# Patient Record
Sex: MALE | Race: BLACK OR AFRICAN AMERICAN | NOT HISPANIC OR LATINO | Employment: FULL TIME | RURAL
[De-identification: names, ages, dates, MRNs, and addresses within clinical notes are randomized per-mention and may not be internally consistent; named-entity substitution may affect disease eponyms.]

---

## 2022-02-16 ENCOUNTER — OFFICE VISIT (OUTPATIENT)
Dept: FAMILY MEDICINE | Facility: CLINIC | Age: 67
End: 2022-02-16
Payer: COMMERCIAL

## 2022-02-16 VITALS
BODY MASS INDEX: 31.4 KG/M2 | DIASTOLIC BLOOD PRESSURE: 93 MMHG | HEIGHT: 69 IN | RESPIRATION RATE: 18 BRPM | WEIGHT: 212 LBS | SYSTOLIC BLOOD PRESSURE: 179 MMHG | HEART RATE: 73 BPM | TEMPERATURE: 98 F

## 2022-02-16 DIAGNOSIS — Z11.59 NEED FOR HEPATITIS C SCREENING TEST: ICD-10-CM

## 2022-02-16 DIAGNOSIS — R53.83 FATIGUE, UNSPECIFIED TYPE: Primary | ICD-10-CM

## 2022-02-16 DIAGNOSIS — Z12.5 SCREENING FOR PROSTATE CANCER: ICD-10-CM

## 2022-02-16 DIAGNOSIS — E53.8 VITAMIN B12 DEFICIENCY: ICD-10-CM

## 2022-02-16 DIAGNOSIS — Z11.4 SCREENING FOR HIV (HUMAN IMMUNODEFICIENCY VIRUS): ICD-10-CM

## 2022-02-16 DIAGNOSIS — E55.9 VITAMIN D DEFICIENCY: ICD-10-CM

## 2022-02-16 DIAGNOSIS — R73.01 IMPAIRED FASTING GLUCOSE: ICD-10-CM

## 2022-02-16 DIAGNOSIS — E78.5 HYPERLIPIDEMIA, UNSPECIFIED HYPERLIPIDEMIA TYPE: ICD-10-CM

## 2022-02-16 DIAGNOSIS — I10 HYPERTENSION, UNSPECIFIED TYPE: ICD-10-CM

## 2022-02-16 LAB
25(OH)D3 SERPL-MCNC: 22 NG/ML
ALBUMIN SERPL BCP-MCNC: 3.9 G/DL (ref 3.5–5)
ALBUMIN/GLOB SERPL: 1.1 {RATIO}
ALP SERPL-CCNC: 60 U/L (ref 45–115)
ALT SERPL W P-5'-P-CCNC: 57 U/L (ref 16–61)
ANION GAP SERPL CALCULATED.3IONS-SCNC: 8 MMOL/L (ref 7–16)
AST SERPL W P-5'-P-CCNC: 41 U/L (ref 15–37)
BASOPHILS # BLD AUTO: 0.06 K/UL (ref 0–0.2)
BASOPHILS NFR BLD AUTO: 1 % (ref 0–1)
BILIRUB SERPL-MCNC: 0.3 MG/DL (ref 0–1.2)
BUN SERPL-MCNC: 20 MG/DL (ref 7–18)
BUN/CREAT SERPL: 20 (ref 6–20)
CALCIUM SERPL-MCNC: 9.4 MG/DL (ref 8.5–10.1)
CHLORIDE SERPL-SCNC: 107 MMOL/L (ref 98–107)
CHOLEST SERPL-MCNC: 232 MG/DL (ref 0–200)
CHOLEST/HDLC SERPL: 3.9 {RATIO}
CO2 SERPL-SCNC: 27 MMOL/L (ref 21–32)
CREAT SERPL-MCNC: 0.98 MG/DL (ref 0.7–1.3)
CREAT UR-MCNC: 122 MG/DL (ref 39–259)
DIFFERENTIAL METHOD BLD: ABNORMAL
EOSINOPHIL # BLD AUTO: 0.23 K/UL (ref 0–0.5)
EOSINOPHIL NFR BLD AUTO: 3.9 % (ref 1–4)
ERYTHROCYTE [DISTWIDTH] IN BLOOD BY AUTOMATED COUNT: 14.6 % (ref 11.5–14.5)
EST. AVERAGE GLUCOSE BLD GHB EST-MCNC: 117 MG/DL
FOLATE SERPL-MCNC: >20 NG/ML (ref 3.1–17.5)
GLOBULIN SER-MCNC: 3.5 G/DL (ref 2–4)
GLUCOSE SERPL-MCNC: 117 MG/DL (ref 74–106)
HBA1C MFR BLD HPLC: 6.1 % (ref 4.5–6.6)
HCT VFR BLD AUTO: 41 % (ref 40–54)
HCV AB SER QL: NORMAL
HDLC SERPL-MCNC: 59 MG/DL (ref 40–60)
HGB BLD-MCNC: 13.5 G/DL (ref 13.5–18)
HIV 1+O+2 AB SERPL QL: NORMAL
IMM GRANULOCYTES # BLD AUTO: 0.01 K/UL (ref 0–0.04)
IMM GRANULOCYTES NFR BLD: 0.2 % (ref 0–0.4)
LDLC SERPL CALC-MCNC: 123 MG/DL
LDLC/HDLC SERPL: 2.1 {RATIO}
LYMPHOCYTES # BLD AUTO: 2.27 K/UL (ref 1–4.8)
LYMPHOCYTES NFR BLD AUTO: 38.6 % (ref 27–41)
MCH RBC QN AUTO: 28.6 PG (ref 27–31)
MCHC RBC AUTO-ENTMCNC: 32.9 G/DL (ref 32–36)
MCV RBC AUTO: 86.9 FL (ref 80–96)
MICROALBUMIN UR-MCNC: 5.6 MG/DL (ref 0–2.8)
MICROALBUMIN/CREAT RATIO PNL UR: 45.9 MG/G (ref 0–30)
MONOCYTES # BLD AUTO: 0.52 K/UL (ref 0–0.8)
MONOCYTES NFR BLD AUTO: 8.8 % (ref 2–6)
MPC BLD CALC-MCNC: 10.4 FL (ref 9.4–12.4)
NEUTROPHILS # BLD AUTO: 2.79 K/UL (ref 1.8–7.7)
NEUTROPHILS NFR BLD AUTO: 47.5 % (ref 53–65)
NONHDLC SERPL-MCNC: 173 MG/DL
NRBC # BLD AUTO: 0 X10E3/UL
NRBC, AUTO (.00): 0 %
PLATELET # BLD AUTO: 255 K/UL (ref 150–400)
POTASSIUM SERPL-SCNC: 4 MMOL/L (ref 3.5–5.1)
PROT SERPL-MCNC: 7.4 G/DL (ref 6.4–8.2)
PSA SERPL-MCNC: 0.28 NG/ML (ref 0–4.1)
RBC # BLD AUTO: 4.72 M/UL (ref 4.6–6.2)
SODIUM SERPL-SCNC: 138 MMOL/L (ref 136–145)
TRIGL SERPL-MCNC: 248 MG/DL (ref 35–150)
TSH SERPL DL<=0.005 MIU/L-ACNC: 1.1 UIU/ML (ref 0.36–3.74)
VIT B12 SERPL-MCNC: 932 PG/ML (ref 193–986)
VLDLC SERPL-MCNC: 50 MG/DL
WBC # BLD AUTO: 5.88 K/UL (ref 4.5–11)

## 2022-02-16 PROCEDURE — 99214 PR OFFICE/OUTPT VISIT, EST, LEVL IV, 30-39 MIN: ICD-10-PCS | Mod: ,,, | Performed by: NURSE PRACTITIONER

## 2022-02-16 PROCEDURE — G0103 PSA SCREENING: HCPCS | Mod: ,,, | Performed by: CLINICAL MEDICAL LABORATORY

## 2022-02-16 PROCEDURE — 83036 HEMOGLOBIN GLYCOSYLATED A1C: CPT | Mod: ,,, | Performed by: CLINICAL MEDICAL LABORATORY

## 2022-02-16 PROCEDURE — 82746 VITAMIN B12/FOLATE, SERUM PANEL: ICD-10-PCS | Mod: ,,, | Performed by: CLINICAL MEDICAL LABORATORY

## 2022-02-16 PROCEDURE — 1160F RVW MEDS BY RX/DR IN RCRD: CPT | Mod: CPTII,,, | Performed by: NURSE PRACTITIONER

## 2022-02-16 PROCEDURE — 82043 MICROALBUMIN / CREATININE RATIO URINE: ICD-10-PCS | Mod: ,,, | Performed by: CLINICAL MEDICAL LABORATORY

## 2022-02-16 PROCEDURE — 1160F PR REVIEW ALL MEDS BY PRESCRIBER/CLIN PHARMACIST DOCUMENTED: ICD-10-PCS | Mod: CPTII,,, | Performed by: NURSE PRACTITIONER

## 2022-02-16 PROCEDURE — 82306 VITAMIN D 25 HYDROXY: CPT | Mod: ,,, | Performed by: CLINICAL MEDICAL LABORATORY

## 2022-02-16 PROCEDURE — 82607 VITAMIN B-12: CPT | Mod: ,,, | Performed by: CLINICAL MEDICAL LABORATORY

## 2022-02-16 PROCEDURE — 82570 MICROALBUMIN / CREATININE RATIO URINE: ICD-10-PCS | Mod: ,,, | Performed by: CLINICAL MEDICAL LABORATORY

## 2022-02-16 PROCEDURE — 82746 ASSAY OF FOLIC ACID SERUM: CPT | Mod: ,,, | Performed by: CLINICAL MEDICAL LABORATORY

## 2022-02-16 PROCEDURE — 80053 COMPREHENSIVE METABOLIC PANEL: ICD-10-PCS | Mod: ,,, | Performed by: CLINICAL MEDICAL LABORATORY

## 2022-02-16 PROCEDURE — 87389 HIV 1 / 2 ANTIBODY: ICD-10-PCS | Mod: ,,, | Performed by: CLINICAL MEDICAL LABORATORY

## 2022-02-16 PROCEDURE — 99214 OFFICE O/P EST MOD 30 MIN: CPT | Mod: ,,, | Performed by: NURSE PRACTITIONER

## 2022-02-16 PROCEDURE — 1159F PR MEDICATION LIST DOCUMENTED IN MEDICAL RECORD: ICD-10-PCS | Mod: CPTII,,, | Performed by: NURSE PRACTITIONER

## 2022-02-16 PROCEDURE — 3077F PR MOST RECENT SYSTOLIC BLOOD PRESSURE >= 140 MM HG: ICD-10-PCS | Mod: CPTII,,, | Performed by: NURSE PRACTITIONER

## 2022-02-16 PROCEDURE — 86803 HEPATITIS C ANTIBODY: ICD-10-PCS | Mod: ,,, | Performed by: CLINICAL MEDICAL LABORATORY

## 2022-02-16 PROCEDURE — 82043 UR ALBUMIN QUANTITATIVE: CPT | Mod: ,,, | Performed by: CLINICAL MEDICAL LABORATORY

## 2022-02-16 PROCEDURE — 80053 COMPREHEN METABOLIC PANEL: CPT | Mod: ,,, | Performed by: CLINICAL MEDICAL LABORATORY

## 2022-02-16 PROCEDURE — 84443 ASSAY THYROID STIM HORMONE: CPT | Mod: ,,, | Performed by: CLINICAL MEDICAL LABORATORY

## 2022-02-16 PROCEDURE — 80061 LIPID PANEL: CPT | Mod: ,,, | Performed by: CLINICAL MEDICAL LABORATORY

## 2022-02-16 PROCEDURE — 3077F SYST BP >= 140 MM HG: CPT | Mod: CPTII,,, | Performed by: NURSE PRACTITIONER

## 2022-02-16 PROCEDURE — 82306 VITAMIN D: ICD-10-PCS | Mod: ,,, | Performed by: CLINICAL MEDICAL LABORATORY

## 2022-02-16 PROCEDURE — 87389 HIV-1 AG W/HIV-1&-2 AB AG IA: CPT | Mod: ,,, | Performed by: CLINICAL MEDICAL LABORATORY

## 2022-02-16 PROCEDURE — 85025 CBC WITH DIFFERENTIAL: ICD-10-PCS | Mod: ,,, | Performed by: CLINICAL MEDICAL LABORATORY

## 2022-02-16 PROCEDURE — 1159F MED LIST DOCD IN RCRD: CPT | Mod: CPTII,,, | Performed by: NURSE PRACTITIONER

## 2022-02-16 PROCEDURE — 82607 VITAMIN B12/FOLATE, SERUM PANEL: ICD-10-PCS | Mod: ,,, | Performed by: CLINICAL MEDICAL LABORATORY

## 2022-02-16 PROCEDURE — 85025 COMPLETE CBC W/AUTO DIFF WBC: CPT | Mod: ,,, | Performed by: CLINICAL MEDICAL LABORATORY

## 2022-02-16 PROCEDURE — 3008F BODY MASS INDEX DOCD: CPT | Mod: CPTII,,, | Performed by: NURSE PRACTITIONER

## 2022-02-16 PROCEDURE — 3080F DIAST BP >= 90 MM HG: CPT | Mod: CPTII,,, | Performed by: NURSE PRACTITIONER

## 2022-02-16 PROCEDURE — 83036 HEMOGLOBIN A1C: ICD-10-PCS | Mod: ,,, | Performed by: CLINICAL MEDICAL LABORATORY

## 2022-02-16 PROCEDURE — 82570 ASSAY OF URINE CREATININE: CPT | Mod: ,,, | Performed by: CLINICAL MEDICAL LABORATORY

## 2022-02-16 PROCEDURE — 3080F PR MOST RECENT DIASTOLIC BLOOD PRESSURE >= 90 MM HG: ICD-10-PCS | Mod: CPTII,,, | Performed by: NURSE PRACTITIONER

## 2022-02-16 PROCEDURE — 3008F PR BODY MASS INDEX (BMI) DOCUMENTED: ICD-10-PCS | Mod: CPTII,,, | Performed by: NURSE PRACTITIONER

## 2022-02-16 PROCEDURE — 84443 TSH: ICD-10-PCS | Mod: ,,, | Performed by: CLINICAL MEDICAL LABORATORY

## 2022-02-16 PROCEDURE — 86803 HEPATITIS C AB TEST: CPT | Mod: ,,, | Performed by: CLINICAL MEDICAL LABORATORY

## 2022-02-16 PROCEDURE — 80061 LIPID PANEL: ICD-10-PCS | Mod: ,,, | Performed by: CLINICAL MEDICAL LABORATORY

## 2022-02-16 PROCEDURE — G0103 PSA, SCREENING: ICD-10-PCS | Mod: ,,, | Performed by: CLINICAL MEDICAL LABORATORY

## 2022-02-16 RX ORDER — AMLODIPINE BESYLATE 10 MG/1
10 TABLET ORAL DAILY
Qty: 90 TABLET | Refills: 1 | Status: SHIPPED | OUTPATIENT
Start: 2022-02-16 | End: 2022-07-21 | Stop reason: SDUPTHER

## 2022-02-16 NOTE — PROGRESS NOTES
NAINA Cartwright   [address] [place] [address]     PATIENT NAME: Joey Hughes  : 1955  DATE: 22  MRN: [id number]      Billing Provider: NAINA Cartwright  Level of Service: [place] OFFICE/OUTPT VISIT, EST, LEVL IV, 30-39 MIN  Patient PCP Information     Provider PCP Type    NAINA Cartwright General          Reason for Visit / Chief Complaint: Follow-up (Check up)       Update PCP  Update Chief Complaint         History of Present Illness / Problem Focused Workflow     Joey Hughes presents to the clinic with Follow-up (Check up)     HPI    Review of Systems     Review of Systems   Constitutional: Negative for chills, diaphoresis, fatigue and fever.   HENT: Negative for nasal congestion, dental problem, ear pain and postnasal drip.    Eyes: Negative for visual disturbance.   Respiratory: Negative for shortness of breath and wheezing.    Cardiovascular: Negative for chest pain and palpitations.   Gastrointestinal: Negative for abdominal distention and abdominal pain.   Endocrine: Negative for cold intolerance and heat intolerance.   Genitourinary: Negative for enuresis.   Musculoskeletal: Negative for neck stiffness.   Integumentary:  Negative for pallor and rash.   Neurological: Negative for dizziness, seizures, speech difficulty and weakness.   Psychiatric/Behavioral: Negative for agitation.        Medical / Social / Family History     Past Medical History:   Diagnosis Date    High cholesterol     Hypertension        History reviewed. No pertinent surgical history.    Social History    reports that he has never smoked. He has never used smokeless tobacco. He reports current alcohol use. He reports that he does not use drugs.    Family History  's family history is not on file.    Medications and Allergies     Medications  No outpatient medications have been marked as taking for the 22 encounter (Office Visit) with NAINA Cartwright.       Allergies  Review of  "patient's allergies indicates:  No Known Allergies    Physical Examination   BP (!) 179/93 (BP Location: Left arm, Patient Position: Sitting, BP Method: Medium (Automatic))   Pulse 73   Temp 97.7 °F (36.5 °C) (Temporal)   Resp 18   Ht 5' 9" (1.753 m)   Wt 96.2 kg (212 lb)   BMI 31.31 kg/m²   Physical Exam  Vitals and nursing note reviewed.   Constitutional:       Appearance: Normal appearance.   HENT:      Head: Normocephalic and atraumatic.      Right Ear: External ear normal.      Left Ear: External ear normal.      Nose: Nose normal.      Mouth/Throat:      Mouth: Mucous membranes are moist.      Pharynx: Oropharynx is clear.   Eyes:      Pupils: Pupils are equal, round, and reactive to light.   Cardiovascular:      Rate and Rhythm: Normal rate and regular rhythm.      Pulses: Normal pulses.      Heart sounds: Normal heart sounds. No murmur heard.  No gallop.    Pulmonary:      Effort: Pulmonary effort is normal.      Breath sounds: Normal breath sounds. No wheezing or rales.   Abdominal:      General: Abdomen is flat. Bowel sounds are normal. There is no distension.      Palpations: Abdomen is soft.      Tenderness: There is no abdominal tenderness.   Musculoskeletal:         General: Normal range of motion.      Cervical back: Normal range of motion and neck supple.   Skin:     General: Skin is warm and dry.      Capillary Refill: Capillary refill takes less than 2 seconds.   Neurological:      General: No focal deficit present.      Mental Status: He is alert and oriented to person, place, and time.   Psychiatric:         Mood and Affect: Mood normal.         Behavior: Behavior normal.          Assessment and Plan (including Health Maintenance)      Problem List  Smart Sets  Document Outside HM   :    Plan:         Health Maintenance Due   Topic Date Due    Hepatitis C Screening  Never done    Lipid Panel  Never done    COVID-19 Vaccine (1) Never done    HIV Screening  Never done    TETANUS VACCINE  " Never done    Colorectal Cancer Screening  Never done    Shingles Vaccine (1 of 2) Never done    Influenza Vaccine (1) Never done       Problem List Items Addressed This Visit    None     Visit Diagnoses     Fatigue, unspecified type    -  Primary    Relevant Orders    CBC Auto Differential    TSH    Screening for prostate cancer        Relevant Orders    PSA, Screening    Vitamin B12 deficiency        Relevant Orders    Vitamin B12 & Folate    Vitamin D deficiency        Relevant Orders    Vitamin D    Hypertension, unspecified type        Relevant Medications    amLODIPine (NORVASC) 10 MG tablet    Other Relevant Orders    Comprehensive Metabolic Panel    Hyperlipidemia, unspecified hyperlipidemia type        Relevant Orders    Lipid Panel    Impaired fasting glucose        Relevant Orders    Microalbumin/Creatinine Ratio, Urine    Hemoglobin A1C    Need for hepatitis C screening test        Relevant Orders    Hepatitis C Antibody    Screening for HIV (human immunodeficiency virus)        Relevant Orders    HIV 1/2 Ag/Ab (4th Gen)          The patient has no Health Maintenance topics of status Not Due    No future appointments.     Follow up in about 3 months (around 5/16/2022), or if symptoms worsen or fail to improve.        Signature:  NAINA Cartwright      [address] [place] [address]    Date of encounter: 2/16/22

## 2022-02-17 RX ORDER — ROSUVASTATIN CALCIUM 5 MG/1
5 TABLET, COATED ORAL DAILY
Qty: 90 TABLET | Refills: 1 | Status: SHIPPED | OUTPATIENT
Start: 2022-02-17 | End: 2022-07-21 | Stop reason: SDUPTHER

## 2022-02-17 NOTE — PROGRESS NOTES
Choelsterol high, sent in crestor  A1c shows prediabetes, watch carbs, sugary drinks, concentrate on weight loss of 8-10 lbs and drink plenty of water. We ill recheck this in 3 months.

## 2022-02-18 ENCOUNTER — PATIENT OUTREACH (OUTPATIENT)
Dept: ADMINISTRATIVE | Facility: HOSPITAL | Age: 67
End: 2022-02-18

## 2022-03-02 ENCOUNTER — OFFICE VISIT (OUTPATIENT)
Dept: FAMILY MEDICINE | Facility: CLINIC | Age: 67
End: 2022-03-02
Payer: COMMERCIAL

## 2022-03-02 VITALS
TEMPERATURE: 98 F | HEART RATE: 64 BPM | SYSTOLIC BLOOD PRESSURE: 147 MMHG | HEIGHT: 69 IN | DIASTOLIC BLOOD PRESSURE: 78 MMHG | RESPIRATION RATE: 18 BRPM | WEIGHT: 212 LBS | BODY MASS INDEX: 31.4 KG/M2

## 2022-03-02 DIAGNOSIS — I10 HYPERTENSION, UNSPECIFIED TYPE: Primary | ICD-10-CM

## 2022-03-02 DIAGNOSIS — Z12.11 SCREENING FOR COLON CANCER: ICD-10-CM

## 2022-03-02 PROCEDURE — 3077F PR MOST RECENT SYSTOLIC BLOOD PRESSURE >= 140 MM HG: ICD-10-PCS | Mod: CPTII,,, | Performed by: NURSE PRACTITIONER

## 2022-03-02 PROCEDURE — 3044F HG A1C LEVEL LT 7.0%: CPT | Mod: CPTII,,, | Performed by: NURSE PRACTITIONER

## 2022-03-02 PROCEDURE — 99213 PR OFFICE/OUTPT VISIT, EST, LEVL III, 20-29 MIN: ICD-10-PCS | Mod: ,,, | Performed by: NURSE PRACTITIONER

## 2022-03-02 PROCEDURE — 3078F PR MOST RECENT DIASTOLIC BLOOD PRESSURE < 80 MM HG: ICD-10-PCS | Mod: CPTII,,, | Performed by: NURSE PRACTITIONER

## 2022-03-02 PROCEDURE — 1160F RVW MEDS BY RX/DR IN RCRD: CPT | Mod: CPTII,,, | Performed by: NURSE PRACTITIONER

## 2022-03-02 PROCEDURE — 99213 OFFICE O/P EST LOW 20 MIN: CPT | Mod: ,,, | Performed by: NURSE PRACTITIONER

## 2022-03-02 PROCEDURE — 3066F NEPHROPATHY DOC TX: CPT | Mod: CPTII,,, | Performed by: NURSE PRACTITIONER

## 2022-03-02 PROCEDURE — 1160F PR REVIEW ALL MEDS BY PRESCRIBER/CLIN PHARMACIST DOCUMENTED: ICD-10-PCS | Mod: CPTII,,, | Performed by: NURSE PRACTITIONER

## 2022-03-02 PROCEDURE — 3060F POS MICROALBUMINURIA REV: CPT | Mod: CPTII,,, | Performed by: NURSE PRACTITIONER

## 2022-03-02 PROCEDURE — 3060F PR POS MICROALBUMINURIA RESULT DOCUMENTED/REVIEW: ICD-10-PCS | Mod: CPTII,,, | Performed by: NURSE PRACTITIONER

## 2022-03-02 PROCEDURE — 3066F PR DOCUMENTATION OF TREATMENT FOR NEPHROPATHY: ICD-10-PCS | Mod: CPTII,,, | Performed by: NURSE PRACTITIONER

## 2022-03-02 PROCEDURE — 1159F PR MEDICATION LIST DOCUMENTED IN MEDICAL RECORD: ICD-10-PCS | Mod: CPTII,,, | Performed by: NURSE PRACTITIONER

## 2022-03-02 PROCEDURE — 3008F PR BODY MASS INDEX (BMI) DOCUMENTED: ICD-10-PCS | Mod: CPTII,,, | Performed by: NURSE PRACTITIONER

## 2022-03-02 PROCEDURE — 3077F SYST BP >= 140 MM HG: CPT | Mod: CPTII,,, | Performed by: NURSE PRACTITIONER

## 2022-03-02 PROCEDURE — 1159F MED LIST DOCD IN RCRD: CPT | Mod: CPTII,,, | Performed by: NURSE PRACTITIONER

## 2022-03-02 PROCEDURE — 3044F PR MOST RECENT HEMOGLOBIN A1C LEVEL <7.0%: ICD-10-PCS | Mod: CPTII,,, | Performed by: NURSE PRACTITIONER

## 2022-03-02 PROCEDURE — 3008F BODY MASS INDEX DOCD: CPT | Mod: CPTII,,, | Performed by: NURSE PRACTITIONER

## 2022-03-02 PROCEDURE — 3078F DIAST BP <80 MM HG: CPT | Mod: CPTII,,, | Performed by: NURSE PRACTITIONER

## 2022-03-02 NOTE — ASSESSMENT & PLAN NOTE
Blood Pressure Trends are:  BP Readings from Last 6 Encounters:   03/02/22 (!) 147/78   02/16/22 (!) 179/93       Most Recent Chemistry:  CMP  Lab Results   Component Value Date     02/16/2022    K 4.0 02/16/2022     02/16/2022    CO2 27 02/16/2022     (H) 02/16/2022    BUN 20 (H) 02/16/2022    CREATININE 0.98 02/16/2022    CALCIUM 9.4 02/16/2022    PROT 7.4 02/16/2022    ALBUMIN 3.9 02/16/2022    BILITOT 0.3 02/16/2022    ALKPHOS 60 02/16/2022    AST 41 (H) 02/16/2022    ALT 57 02/16/2022    ANIONGAP 8 02/16/2022    EGFRNONAA 82 02/16/2022      Patient presents to the clinic for hypertension follow up. Patient's blood pressure at this visit is 147/78 which is much improved since previous visit. Discussed lifestyle modifications including weight loss, exercise, and low sodium diet. Patient to obtain blood pressure machine and check blood pressures at home. He will bring BP log to next visit. Patient to follow up with me in clinic.

## 2022-03-02 NOTE — PROGRESS NOTES
NAINA Cartwright   [address] [place] [address]     PATIENT NAME: Joey Hughes  : 1955  DATE: 3/2/22  MRN: [id number]      Billing Provider: NAINA Cartwright  Level of Service: [place] OFFICE/OUTPT VISIT, EST, LEVL III, 20-29 MIN  Patient PCP Information     Provider PCP Type    NAINA Cartwright General          Reason for Visit / Chief Complaint: Follow-up (2 week follow up)       Update PCP  Update Chief Complaint         History of Present Illness / Problem Focused Workflow     Joey Hughes presents to the clinic with Follow-up (2 week follow up)     HPI    Review of Systems     Review of Systems   Constitutional: Negative for chills, diaphoresis, fatigue and fever.   HENT: Negative for nasal congestion, dental problem, ear pain and postnasal drip.    Eyes: Negative for visual disturbance.   Respiratory: Negative for shortness of breath and wheezing.    Cardiovascular: Negative for chest pain and palpitations.   Gastrointestinal: Negative for abdominal distention and abdominal pain.   Endocrine: Negative for cold intolerance and heat intolerance.   Genitourinary: Negative for enuresis.   Musculoskeletal: Negative for neck stiffness.   Integumentary:  Negative for pallor and rash.   Neurological: Negative for dizziness, seizures, speech difficulty and weakness.   Psychiatric/Behavioral: Negative for agitation.        Medical / Social / Family History     Past Medical History:   Diagnosis Date    High cholesterol     Hypertension        History reviewed. No pertinent surgical history.    Social History    reports that he has never smoked. He has never used smokeless tobacco. He reports current alcohol use. He reports that he does not use drugs.    Family History  's family history is not on file.    Medications and Allergies     Medications  No outpatient medications have been marked as taking for the 3/2/22 encounter (Office Visit) with NAINA Cartwright.  "      Allergies  Review of patient's allergies indicates:  No Known Allergies    Physical Examination   BP (!) 147/78 (BP Location: Left arm, Patient Position: Sitting, BP Method: Medium (Automatic))   Pulse 64   Temp 97.7 °F (36.5 °C) (Temporal)   Resp 18   Ht 5' 9" (1.753 m)   Wt 96.2 kg (212 lb)   BMI 31.31 kg/m²   Physical Exam  Vitals and nursing note reviewed.   Constitutional:       Appearance: Normal appearance.   HENT:      Head: Normocephalic and atraumatic.      Right Ear: External ear normal.      Left Ear: External ear normal.      Nose: Nose normal.      Mouth/Throat:      Mouth: Mucous membranes are moist.      Pharynx: Oropharynx is clear.   Eyes:      Pupils: Pupils are equal, round, and reactive to light.   Cardiovascular:      Rate and Rhythm: Normal rate and regular rhythm.      Pulses: Normal pulses.      Heart sounds: Normal heart sounds. No murmur heard.    No gallop.   Pulmonary:      Effort: Pulmonary effort is normal.      Breath sounds: Normal breath sounds. No wheezing or rales.   Abdominal:      General: Abdomen is flat. Bowel sounds are normal. There is no distension.      Palpations: Abdomen is soft.      Tenderness: There is no abdominal tenderness.   Musculoskeletal:         General: Normal range of motion.      Cervical back: Normal range of motion and neck supple.   Skin:     General: Skin is warm and dry.      Capillary Refill: Capillary refill takes less than 2 seconds.   Neurological:      General: No focal deficit present.      Mental Status: He is alert and oriented to person, place, and time.   Psychiatric:         Mood and Affect: Mood normal.         Behavior: Behavior normal.          Assessment and Plan (including Health Maintenance)      Problem List  Smart Sets  Document Outside HM   :    Plan:         Health Maintenance Due   Topic Date Due    COVID-19 Vaccine (1) Never done    TETANUS VACCINE  Never done    Shingles Vaccine (1 of 2) Never done    Influenza " Vaccine (1) Never done    Colorectal Cancer Screening  09/27/2021       Problem List Items Addressed This Visit        Cardiac/Vascular    Hypertension - Primary    Current Assessment & Plan     Blood Pressure Trends are:  BP Readings from Last 6 Encounters:   03/02/22 (!) 147/78   02/16/22 (!) 179/93       Most Recent Chemistry:  CMP  Lab Results   Component Value Date     02/16/2022    K 4.0 02/16/2022     02/16/2022    CO2 27 02/16/2022     (H) 02/16/2022    BUN 20 (H) 02/16/2022    CREATININE 0.98 02/16/2022    CALCIUM 9.4 02/16/2022    PROT 7.4 02/16/2022    ALBUMIN 3.9 02/16/2022    BILITOT 0.3 02/16/2022    ALKPHOS 60 02/16/2022    AST 41 (H) 02/16/2022    ALT 57 02/16/2022    ANIONGAP 8 02/16/2022    EGFRNONAA 82 02/16/2022                       Health Maintenance Topics with due status: Not Due       Topic Last Completion Date    Lipid Panel 02/16/2022       No future appointments.     Follow up in about 3 months (around 6/2/2022), or if symptoms worsen or fail to improve.        Signature:  NAINA Cartwright      [address] [place] [address]    Date of encounter: 3/2/22

## 2022-03-02 NOTE — PROGRESS NOTES
NAINA Cartwright   [address] [place] [address]     PATIENT NAME: Joey Hughes  : 1955  DATE: 3/2/22  MRN: [id number]      Billing Provider: NAINA Cartwright  Level of Service: [place] OFFICE/OUTPT VISIT, EST, LEVL III, 20-29 MIN  Patient PCP Information     Provider PCP Type    NAINA Cartwright General          Reason for Visit / Chief Complaint: Follow-up (2 week follow up)       Update PCP  Update Chief Complaint         History of Present Illness / Problem Focused Workflow     Joey Hughes presents to the clinic with Follow-up (2 week follow up)     HPI    Review of Systems     Review of Systems   Constitutional: Negative for chills, diaphoresis, fatigue and fever.   HENT: Negative for nasal congestion, dental problem, ear pain and postnasal drip.    Eyes: Negative for visual disturbance.   Respiratory: Negative for shortness of breath and wheezing.    Cardiovascular: Negative for chest pain and palpitations.   Gastrointestinal: Negative for abdominal distention and abdominal pain.   Endocrine: Negative for cold intolerance and heat intolerance.   Genitourinary: Negative for enuresis.   Musculoskeletal: Negative for neck stiffness.   Integumentary:  Negative for pallor and rash.   Neurological: Negative for dizziness, seizures, speech difficulty and weakness.   Psychiatric/Behavioral: Negative for agitation.        Medical / Social / Family History     Past Medical History:   Diagnosis Date    High cholesterol     Hypertension        History reviewed. No pertinent surgical history.    Social History    reports that he has never smoked. He has never used smokeless tobacco. He reports current alcohol use. He reports that he does not use drugs.    Family History  's family history is not on file.    Medications and Allergies     Medications  No outpatient medications have been marked as taking for the 3/2/22 encounter (Office Visit) with NAINA Cartwright.  "      Allergies  Review of patient's allergies indicates:  No Known Allergies    Physical Examination   BP (!) 147/78 (BP Location: Left arm, Patient Position: Sitting, BP Method: Medium (Automatic))   Pulse 64   Temp 97.7 °F (36.5 °C) (Temporal)   Resp 18   Ht 5' 9" (1.753 m)   Wt 96.2 kg (212 lb)   BMI 31.31 kg/m²   Physical Exam  Constitutional:       Appearance: Normal appearance.   HENT:      Head: Normocephalic and atraumatic.      Right Ear: External ear normal.      Left Ear: External ear normal.      Nose: Nose normal.      Mouth/Throat:      Mouth: Mucous membranes are moist.      Pharynx: Oropharynx is clear.   Eyes:      Pupils: Pupils are equal, round, and reactive to light.   Cardiovascular:      Rate and Rhythm: Normal rate and regular rhythm.      Heart sounds: No murmur heard.    No gallop.   Pulmonary:      Effort: Pulmonary effort is normal.      Breath sounds: No wheezing or rales.   Abdominal:      General: Bowel sounds are normal. There is no distension.      Palpations: Abdomen is soft.      Tenderness: There is no abdominal tenderness.   Musculoskeletal:      Cervical back: Normal range of motion and neck supple.   Skin:     General: Skin is warm and dry.   Neurological:      General: No focal deficit present.      Mental Status: He is alert and oriented to person, place, and time.   Psychiatric:         Mood and Affect: Mood normal.         Behavior: Behavior normal.          Assessment and Plan (including Health Maintenance)      Problem List  Smart Sets  Document Outside HM   :    Plan:         Health Maintenance Due   Topic Date Due    COVID-19 Vaccine (1) Never done    TETANUS VACCINE  Never done    Shingles Vaccine (1 of 2) Never done    Influenza Vaccine (1) Never done    Colorectal Cancer Screening  09/27/2021       Problem List Items Addressed This Visit        Cardiac/Vascular    Hypertension - Primary    Current Assessment & Plan     Blood Pressure Trends are:  BP " Readings from Last 6 Encounters:   03/02/22 (!) 147/78   02/16/22 (!) 179/93       Most Recent Chemistry:  CMP  Lab Results   Component Value Date     02/16/2022    K 4.0 02/16/2022     02/16/2022    CO2 27 02/16/2022     (H) 02/16/2022    BUN 20 (H) 02/16/2022    CREATININE 0.98 02/16/2022    CALCIUM 9.4 02/16/2022    PROT 7.4 02/16/2022    ALBUMIN 3.9 02/16/2022    BILITOT 0.3 02/16/2022    ALKPHOS 60 02/16/2022    AST 41 (H) 02/16/2022    ALT 57 02/16/2022    ANIONGAP 8 02/16/2022    EGFRNONAA 82 02/16/2022      Patient presents to the clinic for hypertension follow up. Patient's blood pressure at this visit is 147/78 which is much improved since previous visit. Discussed lifestyle modifications including weight loss, exercise, and low sodium diet. Patient to obtain blood pressure machine and check blood pressures at home. He will bring BP log to next visit. Patient to follow up with me in clinic.              Other Visit Diagnoses     Screening for colon cancer        Relevant Orders    Ambulatory referral/consult to Gastroenterology          Health Maintenance Topics with due status: Not Due       Topic Last Completion Date    Lipid Panel 02/16/2022       Future Appointments   Date Time Provider Department Center   6/1/2022  9:30 AM NAINA Cartwright [facility]        Follow up in about 3 months (around 6/2/2022), or if symptoms worsen or fail to improve.        Signature:  NAINA Cartwright      [address] [place] [address]    Date of encounter: 3/2/22

## 2022-04-08 DIAGNOSIS — I10 HYPERTENSION, UNSPECIFIED TYPE: ICD-10-CM

## 2022-06-09 ENCOUNTER — OFFICE VISIT (OUTPATIENT)
Dept: FAMILY MEDICINE | Facility: CLINIC | Age: 67
End: 2022-06-09
Payer: COMMERCIAL

## 2022-06-09 VITALS
SYSTOLIC BLOOD PRESSURE: 160 MMHG | TEMPERATURE: 98 F | WEIGHT: 214 LBS | BODY MASS INDEX: 31.7 KG/M2 | RESPIRATION RATE: 18 BRPM | DIASTOLIC BLOOD PRESSURE: 90 MMHG | HEART RATE: 69 BPM | HEIGHT: 69 IN

## 2022-06-09 DIAGNOSIS — R73.01 IMPAIRED FASTING GLUCOSE: ICD-10-CM

## 2022-06-09 DIAGNOSIS — E78.5 HYPERLIPIDEMIA, UNSPECIFIED HYPERLIPIDEMIA TYPE: ICD-10-CM

## 2022-06-09 DIAGNOSIS — I10 HYPERTENSION, UNSPECIFIED TYPE: Primary | ICD-10-CM

## 2022-06-09 LAB
ALBUMIN SERPL BCP-MCNC: 4 G/DL (ref 3.5–5)
ALBUMIN/GLOB SERPL: 1.4 {RATIO}
ALP SERPL-CCNC: 56 U/L (ref 45–115)
ALT SERPL W P-5'-P-CCNC: 36 U/L (ref 16–61)
ANION GAP SERPL CALCULATED.3IONS-SCNC: 11 MMOL/L (ref 7–16)
AST SERPL W P-5'-P-CCNC: 22 U/L (ref 15–37)
BILIRUB SERPL-MCNC: 0.5 MG/DL (ref 0–1.2)
BUN SERPL-MCNC: 16 MG/DL (ref 7–18)
BUN/CREAT SERPL: 16 (ref 6–20)
CALCIUM SERPL-MCNC: 8.8 MG/DL (ref 8.5–10.1)
CHLORIDE SERPL-SCNC: 107 MMOL/L (ref 98–107)
CHOLEST SERPL-MCNC: 217 MG/DL (ref 0–200)
CHOLEST/HDLC SERPL: 3.9 {RATIO}
CO2 SERPL-SCNC: 26 MMOL/L (ref 21–32)
CREAT SERPL-MCNC: 0.97 MG/DL (ref 0.7–1.3)
CREAT UR-MCNC: 99 MG/DL (ref 39–259)
EST. AVERAGE GLUCOSE BLD GHB EST-MCNC: 117 MG/DL
GLOBULIN SER-MCNC: 2.9 G/DL (ref 2–4)
GLUCOSE SERPL-MCNC: 117 MG/DL (ref 74–106)
HBA1C MFR BLD HPLC: 6.1 % (ref 4.5–6.6)
HDLC SERPL-MCNC: 56 MG/DL (ref 40–60)
LDLC SERPL CALC-MCNC: 120 MG/DL
LDLC/HDLC SERPL: 2.1 {RATIO}
MICROALBUMIN UR-MCNC: 1.2 MG/DL (ref 0–2.8)
MICROALBUMIN/CREAT RATIO PNL UR: 12.1 MG/G (ref 0–30)
NONHDLC SERPL-MCNC: 161 MG/DL
POTASSIUM SERPL-SCNC: 4.1 MMOL/L (ref 3.5–5.1)
PROT SERPL-MCNC: 6.9 G/DL (ref 6.4–8.2)
SODIUM SERPL-SCNC: 140 MMOL/L (ref 136–145)
TRIGL SERPL-MCNC: 204 MG/DL (ref 35–150)
VLDLC SERPL-MCNC: 41 MG/DL

## 2022-06-09 PROCEDURE — 1160F RVW MEDS BY RX/DR IN RCRD: CPT | Mod: CPTII,,, | Performed by: NURSE PRACTITIONER

## 2022-06-09 PROCEDURE — 3077F PR MOST RECENT SYSTOLIC BLOOD PRESSURE >= 140 MM HG: ICD-10-PCS | Mod: CPTII,,, | Performed by: NURSE PRACTITIONER

## 2022-06-09 PROCEDURE — 99213 OFFICE O/P EST LOW 20 MIN: CPT | Mod: ,,, | Performed by: NURSE PRACTITIONER

## 2022-06-09 PROCEDURE — 1101F PT FALLS ASSESS-DOCD LE1/YR: CPT | Mod: CPTII,,, | Performed by: NURSE PRACTITIONER

## 2022-06-09 PROCEDURE — 3080F PR MOST RECENT DIASTOLIC BLOOD PRESSURE >= 90 MM HG: ICD-10-PCS | Mod: CPTII,,, | Performed by: NURSE PRACTITIONER

## 2022-06-09 PROCEDURE — 3066F PR DOCUMENTATION OF TREATMENT FOR NEPHROPATHY: ICD-10-PCS | Mod: CPTII,,, | Performed by: NURSE PRACTITIONER

## 2022-06-09 PROCEDURE — 1101F PR PT FALLS ASSESS DOC 0-1 FALLS W/OUT INJ PAST YR: ICD-10-PCS | Mod: CPTII,,, | Performed by: NURSE PRACTITIONER

## 2022-06-09 PROCEDURE — 3288F FALL RISK ASSESSMENT DOCD: CPT | Mod: CPTII,,, | Performed by: NURSE PRACTITIONER

## 2022-06-09 PROCEDURE — 3060F POS MICROALBUMINURIA REV: CPT | Mod: CPTII,,, | Performed by: NURSE PRACTITIONER

## 2022-06-09 PROCEDURE — 1159F PR MEDICATION LIST DOCUMENTED IN MEDICAL RECORD: ICD-10-PCS | Mod: CPTII,,, | Performed by: NURSE PRACTITIONER

## 2022-06-09 PROCEDURE — 3044F PR MOST RECENT HEMOGLOBIN A1C LEVEL <7.0%: ICD-10-PCS | Mod: CPTII,,, | Performed by: NURSE PRACTITIONER

## 2022-06-09 PROCEDURE — 3077F SYST BP >= 140 MM HG: CPT | Mod: CPTII,,, | Performed by: NURSE PRACTITIONER

## 2022-06-09 PROCEDURE — 3288F PR FALLS RISK ASSESSMENT DOCUMENTED: ICD-10-PCS | Mod: CPTII,,, | Performed by: NURSE PRACTITIONER

## 2022-06-09 PROCEDURE — 3066F NEPHROPATHY DOC TX: CPT | Mod: CPTII,,, | Performed by: NURSE PRACTITIONER

## 2022-06-09 PROCEDURE — 82570 MICROALBUMIN / CREATININE RATIO URINE: ICD-10-PCS | Mod: ,,, | Performed by: CLINICAL MEDICAL LABORATORY

## 2022-06-09 PROCEDURE — 3080F DIAST BP >= 90 MM HG: CPT | Mod: CPTII,,, | Performed by: NURSE PRACTITIONER

## 2022-06-09 PROCEDURE — 1159F MED LIST DOCD IN RCRD: CPT | Mod: CPTII,,, | Performed by: NURSE PRACTITIONER

## 2022-06-09 PROCEDURE — 80061 LIPID PANEL: ICD-10-PCS | Mod: ,,, | Performed by: CLINICAL MEDICAL LABORATORY

## 2022-06-09 PROCEDURE — 80053 COMPREHENSIVE METABOLIC PANEL: ICD-10-PCS | Mod: ,,, | Performed by: CLINICAL MEDICAL LABORATORY

## 2022-06-09 PROCEDURE — 80061 LIPID PANEL: CPT | Mod: ,,, | Performed by: CLINICAL MEDICAL LABORATORY

## 2022-06-09 PROCEDURE — 3008F PR BODY MASS INDEX (BMI) DOCUMENTED: ICD-10-PCS | Mod: CPTII,,, | Performed by: NURSE PRACTITIONER

## 2022-06-09 PROCEDURE — 1160F PR REVIEW ALL MEDS BY PRESCRIBER/CLIN PHARMACIST DOCUMENTED: ICD-10-PCS | Mod: CPTII,,, | Performed by: NURSE PRACTITIONER

## 2022-06-09 PROCEDURE — 82043 UR ALBUMIN QUANTITATIVE: CPT | Mod: ,,, | Performed by: CLINICAL MEDICAL LABORATORY

## 2022-06-09 PROCEDURE — 3044F HG A1C LEVEL LT 7.0%: CPT | Mod: CPTII,,, | Performed by: NURSE PRACTITIONER

## 2022-06-09 PROCEDURE — 82043 MICROALBUMIN / CREATININE RATIO URINE: ICD-10-PCS | Mod: ,,, | Performed by: CLINICAL MEDICAL LABORATORY

## 2022-06-09 PROCEDURE — 3008F BODY MASS INDEX DOCD: CPT | Mod: CPTII,,, | Performed by: NURSE PRACTITIONER

## 2022-06-09 PROCEDURE — 99213 PR OFFICE/OUTPT VISIT, EST, LEVL III, 20-29 MIN: ICD-10-PCS | Mod: ,,, | Performed by: NURSE PRACTITIONER

## 2022-06-09 PROCEDURE — 82570 ASSAY OF URINE CREATININE: CPT | Mod: ,,, | Performed by: CLINICAL MEDICAL LABORATORY

## 2022-06-09 PROCEDURE — 3060F PR POS MICROALBUMINURIA RESULT DOCUMENTED/REVIEW: ICD-10-PCS | Mod: CPTII,,, | Performed by: NURSE PRACTITIONER

## 2022-06-09 PROCEDURE — 80053 COMPREHEN METABOLIC PANEL: CPT | Mod: ,,, | Performed by: CLINICAL MEDICAL LABORATORY

## 2022-06-09 PROCEDURE — 83036 HEMOGLOBIN A1C: ICD-10-PCS | Mod: ,,, | Performed by: CLINICAL MEDICAL LABORATORY

## 2022-06-09 PROCEDURE — 83036 HEMOGLOBIN GLYCOSYLATED A1C: CPT | Mod: ,,, | Performed by: CLINICAL MEDICAL LABORATORY

## 2022-06-09 RX ORDER — LOSARTAN POTASSIUM AND HYDROCHLOROTHIAZIDE 12.5; 5 MG/1; MG/1
1 TABLET ORAL DAILY
Qty: 90 TABLET | Refills: 3 | Status: SHIPPED | OUTPATIENT
Start: 2022-06-09 | End: 2022-07-21

## 2022-06-09 NOTE — PROGRESS NOTES
NAINA Cartwright   [address] [place] [address]     PATIENT NAME: Joey Hughes  : 1955  DATE: 22  MRN: [id number]      Billing Provider: NAINA Cartwright  Level of Service: [place] OFFICE/OUTPT VISIT, EST, LEVL III, 20-29 MIN  Patient PCP Information     Provider PCP Type    NAINA Cartwright General          Reason for Visit / Chief Complaint: Follow-up (3 month follow up)       Update PCP  Update Chief Complaint         History of Present Illness / Problem Focused Workflow     Joey Hughes presents to the clinic with Follow-up (3 month follow up)     HPI    Review of Systems     Review of Systems   Constitutional: Negative for chills, diaphoresis, fatigue and fever.   HENT: Negative for nasal congestion, dental problem, ear pain and postnasal drip.    Eyes: Negative for visual disturbance.   Respiratory: Negative for shortness of breath and wheezing.    Cardiovascular: Negative for chest pain and palpitations.   Gastrointestinal: Negative for abdominal distention and abdominal pain.   Endocrine: Negative for cold intolerance and heat intolerance.   Genitourinary: Negative for enuresis.   Musculoskeletal: Negative for neck stiffness.   Integumentary:  Negative for pallor and rash.   Neurological: Negative for dizziness, seizures, speech difficulty and weakness.   Psychiatric/Behavioral: Negative for agitation.        Medical / Social / Family History     Past Medical History:   Diagnosis Date    High cholesterol     Hypertension        History reviewed. No pertinent surgical history.    Social History    reports that he has never smoked. He has never used smokeless tobacco. He reports current alcohol use. He reports that he does not use drugs.    Family History  's family history is not on file.    PHQ9 2022   Total Score 0           Medications and Allergies     Medications  No outpatient medications have been marked as taking for the 22 encounter (Office Visit)  "with NAINA Cartwright.       Allergies  Review of patient's allergies indicates:  No Known Allergies    Physical Examination   BP (!) 160/90 (BP Location: Left arm, Patient Position: Sitting, BP Method: Medium (Automatic))   Pulse 69   Temp 98.2 °F (36.8 °C) (Temporal)   Resp 18   Ht 5' 9" (1.753 m)   Wt 97.1 kg (214 lb)   BMI 31.60 kg/m²   Physical Exam  Vitals and nursing note reviewed.   Constitutional:       Appearance: Normal appearance.   HENT:      Head: Normocephalic and atraumatic.      Right Ear: External ear normal.      Left Ear: External ear normal.      Nose: Nose normal.      Mouth/Throat:      Mouth: Mucous membranes are moist.      Pharynx: Oropharynx is clear.   Eyes:      Pupils: Pupils are equal, round, and reactive to light.   Cardiovascular:      Rate and Rhythm: Normal rate and regular rhythm.      Pulses: Normal pulses.      Heart sounds: Normal heart sounds. No murmur heard.    No gallop.   Pulmonary:      Effort: Pulmonary effort is normal.      Breath sounds: Normal breath sounds. No wheezing or rales.   Abdominal:      General: Abdomen is flat. Bowel sounds are normal. There is no distension.      Palpations: Abdomen is soft.      Tenderness: There is no abdominal tenderness.   Musculoskeletal:         General: Normal range of motion.      Cervical back: Normal range of motion and neck supple.   Skin:     General: Skin is warm and dry.      Capillary Refill: Capillary refill takes less than 2 seconds.   Neurological:      General: No focal deficit present.      Mental Status: He is alert and oriented to person, place, and time.   Psychiatric:         Mood and Affect: Mood normal.         Behavior: Behavior normal.          Assessment and Plan (including Health Maintenance)      Problem List  Smart Sets  Document Outside HM   :    Plan:         Health Maintenance Due   Topic Date Due    COVID-19 Vaccine (1) Never done    TETANUS VACCINE  Never done    Shingles Vaccine (1 of 2) " Never done    Colorectal Cancer Screening  09/27/2021    Pneumococcal Vaccines (Age 65+) (1 - PCV) Never done       Problem List Items Addressed This Visit        Cardiac/Vascular    Hypertension - Primary    Relevant Orders    Comprehensive Metabolic Panel    Microalbumin/Creatinine Ratio, Urine    Hyperlipidemia    Relevant Orders    Lipid Panel       Endocrine    Impaired fasting glucose    Relevant Orders    Hemoglobin A1C    BMI 31.0-31.9,adult          Health Maintenance Topics with due status: Not Due       Topic Last Completion Date    Lipid Panel 02/16/2022    Influenza Vaccine Not Due       No future appointments.     Follow up in about 3 months (around 9/9/2022), or if symptoms worsen or fail to improve.        Signature:  NAINA Cartwright      [address] [place] [address]    Date of encounter: 6/9/22

## 2022-07-21 ENCOUNTER — OFFICE VISIT (OUTPATIENT)
Dept: FAMILY MEDICINE | Facility: CLINIC | Age: 67
End: 2022-07-21
Payer: MEDICARE

## 2022-07-21 VITALS
TEMPERATURE: 98 F | BODY MASS INDEX: 32.29 KG/M2 | WEIGHT: 218 LBS | SYSTOLIC BLOOD PRESSURE: 160 MMHG | HEIGHT: 69 IN | RESPIRATION RATE: 18 BRPM | DIASTOLIC BLOOD PRESSURE: 95 MMHG | HEART RATE: 65 BPM

## 2022-07-21 DIAGNOSIS — I10 HYPERTENSION, UNSPECIFIED TYPE: ICD-10-CM

## 2022-07-21 DIAGNOSIS — E78.5 HYPERLIPIDEMIA, UNSPECIFIED HYPERLIPIDEMIA TYPE: Primary | ICD-10-CM

## 2022-07-21 DIAGNOSIS — R73.03 PREDIABETES: ICD-10-CM

## 2022-07-21 DIAGNOSIS — R73.01 IMPAIRED FASTING GLUCOSE: ICD-10-CM

## 2022-07-21 PROCEDURE — 99213 PR OFFICE/OUTPT VISIT, EST, LEVL III, 20-29 MIN: ICD-10-PCS | Mod: ,,, | Performed by: NURSE PRACTITIONER

## 2022-07-21 PROCEDURE — 99213 OFFICE O/P EST LOW 20 MIN: CPT | Mod: ,,, | Performed by: NURSE PRACTITIONER

## 2022-07-21 RX ORDER — LOSARTAN POTASSIUM AND HYDROCHLOROTHIAZIDE 25; 100 MG/1; MG/1
1 TABLET ORAL DAILY
Qty: 90 TABLET | Refills: 3 | Status: SHIPPED | OUTPATIENT
Start: 2022-07-21 | End: 2022-12-09 | Stop reason: SDUPTHER

## 2022-07-21 RX ORDER — AMLODIPINE BESYLATE 10 MG/1
10 TABLET ORAL DAILY
Qty: 90 TABLET | Refills: 1 | Status: SHIPPED | OUTPATIENT
Start: 2022-07-21 | End: 2022-12-09 | Stop reason: SDUPTHER

## 2022-07-21 RX ORDER — ROSUVASTATIN CALCIUM 5 MG/1
5 TABLET, COATED ORAL DAILY
Qty: 90 TABLET | Refills: 1 | Status: SHIPPED | OUTPATIENT
Start: 2022-07-21 | End: 2022-09-20 | Stop reason: SDUPTHER

## 2022-07-21 NOTE — ASSESSMENT & PLAN NOTE
Lab Results   Component Value Date    HGBA1C 6.1 06/09/2022    HGBA1C 6.1 02/16/2022       Wt Readings from Last 3 Encounters:   07/21/22 98.9 kg (218 lb)   06/09/22 97.1 kg (214 lb)   03/02/22 96.2 kg (212 lb)       Body mass index is 32.19 kg/m².    No results found for requested labs within last 720 hours.

## 2022-07-21 NOTE — ASSESSMENT & PLAN NOTE
Blood Pressure Trends are:  BP Readings from Last 6 Encounters:   07/21/22 (!) 160/95   06/09/22 (!) 160/90   03/02/22 (!) 147/78   02/16/22 (!) 179/93       Most Recent Chemistry:  CMP  Lab Results   Component Value Date     06/09/2022    K 4.1 06/09/2022     06/09/2022    CO2 26 06/09/2022     (H) 06/09/2022    BUN 16 06/09/2022    CREATININE 0.97 06/09/2022    CALCIUM 8.8 06/09/2022    PROT 6.9 06/09/2022    ALBUMIN 4.0 06/09/2022    BILITOT 0.5 06/09/2022    ALKPHOS 56 06/09/2022    AST 22 06/09/2022    ALT 36 06/09/2022    ANIONGAP 11 06/09/2022    EGFRNONAA 83 06/09/2022      BP not controlled, increased losartan/hctz to 100/25 from 50/12.5  rtc in 3 months for FU

## 2022-07-21 NOTE — PROGRESS NOTES
NAINA Cartwright   [address] [place] [address]     PATIENT NAME: Joey Hughes  : 1955  DATE: 22  MRN: [id number]      Billing Provider: NAINA Cartwright  Level of Service: [place] OFFICE/OUTPT VISIT, EST, LEVL III, 20-29 MIN  Patient PCP Information     Provider PCP Type    NAINA Cartwright General          Reason for Visit / Chief Complaint: Follow-up (Follow up blood pressure)       Update PCP  Update Chief Complaint         History of Present Illness / Problem Focused Workflow     Joey Hughes presents to the clinic with Follow-up (Follow up blood pressure)     HPI    Review of Systems     Review of Systems   Constitutional: Negative for chills, diaphoresis, fatigue and fever.   HENT: Negative for nasal congestion, dental problem, ear pain and postnasal drip.    Eyes: Negative for visual disturbance.   Respiratory: Negative for shortness of breath and wheezing.    Cardiovascular: Negative for chest pain and palpitations.   Gastrointestinal: Negative for abdominal distention and abdominal pain.   Endocrine: Negative for cold intolerance and heat intolerance.   Genitourinary: Negative for enuresis.   Musculoskeletal: Negative for neck stiffness.   Integumentary:  Negative for pallor and rash.   Neurological: Negative for dizziness, seizures, speech difficulty and weakness.   Psychiatric/Behavioral: Negative for agitation.        Medical / Social / Family History     Past Medical History:   Diagnosis Date    High cholesterol     Hypertension        History reviewed. No pertinent surgical history.    Social History    reports that he has never smoked. He has never used smokeless tobacco. He reports current alcohol use. He reports that he does not use drugs.    Family History  's family history is not on file.    PHQ9 2022   Total Score 0           Medications and Allergies     Medications  No outpatient medications have been marked as taking for the 22 encounter  "(Office Visit) with NAINA Cartwright.       Allergies  Review of patient's allergies indicates:  No Known Allergies    Physical Examination   BP (!) 160/95 (BP Location: Left arm, Patient Position: Sitting, BP Method: Medium (Automatic))   Pulse 65   Temp 97.6 °F (36.4 °C) (Oral)   Resp 18   Ht 5' 9" (1.753 m)   Wt 98.9 kg (218 lb)   BMI 32.19 kg/m²   Physical Exam  Vitals and nursing note reviewed.   Constitutional:       Appearance: Normal appearance.   HENT:      Head: Normocephalic and atraumatic.      Right Ear: External ear normal.      Left Ear: External ear normal.      Nose: Nose normal.      Mouth/Throat:      Mouth: Mucous membranes are moist.      Pharynx: Oropharynx is clear.   Eyes:      Pupils: Pupils are equal, round, and reactive to light.   Cardiovascular:      Rate and Rhythm: Normal rate and regular rhythm.      Pulses: Normal pulses.      Heart sounds: Normal heart sounds. No murmur heard.    No gallop.   Pulmonary:      Effort: Pulmonary effort is normal.      Breath sounds: Normal breath sounds. No wheezing or rales.   Abdominal:      General: Abdomen is flat. Bowel sounds are normal. There is no distension.      Palpations: Abdomen is soft.      Tenderness: There is no abdominal tenderness.   Musculoskeletal:         General: Normal range of motion.      Cervical back: Normal range of motion and neck supple.   Skin:     General: Skin is warm and dry.      Capillary Refill: Capillary refill takes less than 2 seconds.   Neurological:      General: No focal deficit present.      Mental Status: He is alert and oriented to person, place, and time.   Psychiatric:         Mood and Affect: Mood normal.         Behavior: Behavior normal.          Assessment and Plan (including Health Maintenance)      Problem List  Smart Sets  Document Outside HM   :    Plan:         Health Maintenance Due   Topic Date Due    COVID-19 Vaccine (1) Never done    TETANUS VACCINE  Never done    Shingles Vaccine " (1 of 2) Never done    Colorectal Cancer Screening  09/27/2021    Pneumococcal Vaccines (Age 65+) (1 - PCV) Never done       Problem List Items Addressed This Visit        Cardiac/Vascular    Hypertension    Current Assessment & Plan     Blood Pressure Trends are:  BP Readings from Last 6 Encounters:   07/21/22 (!) 160/95   06/09/22 (!) 160/90   03/02/22 (!) 147/78   02/16/22 (!) 179/93       Most Recent Chemistry:  CMP  Lab Results   Component Value Date     06/09/2022    K 4.1 06/09/2022     06/09/2022    CO2 26 06/09/2022     (H) 06/09/2022    BUN 16 06/09/2022    CREATININE 0.97 06/09/2022    CALCIUM 8.8 06/09/2022    PROT 6.9 06/09/2022    ALBUMIN 4.0 06/09/2022    BILITOT 0.5 06/09/2022    ALKPHOS 56 06/09/2022    AST 22 06/09/2022    ALT 36 06/09/2022    ANIONGAP 11 06/09/2022    EGFRNONAA 83 06/09/2022      BP not controlled, increased losartan/hctz to 100/25 from 50/12.5  rtc in 3 months for FU           Relevant Medications    losartan-hydrochlorothiazide 100-25 mg (HYZAAR) 100-25 mg per tablet    amLODIPine (NORVASC) 10 MG tablet    Hyperlipidemia - Primary    Relevant Medications    rosuvastatin (CRESTOR) 5 MG tablet       Endocrine    Impaired fasting glucose    Current Assessment & Plan         Lab Results   Component Value Date    HGBA1C 6.1 06/09/2022    HGBA1C 6.1 02/16/2022       Wt Readings from Last 3 Encounters:   07/21/22 98.9 kg (218 lb)   06/09/22 97.1 kg (214 lb)   03/02/22 96.2 kg (212 lb)       Body mass index is 32.19 kg/m².    No results found for requested labs within last 720 hours.                        BMI 32.0-32.9,adult    Prediabetes          Health Maintenance Topics with due status: Not Due       Topic Last Completion Date    Lipid Panel 06/09/2022    Influenza Vaccine Not Due       No future appointments.     Follow up in about 3 months (around 10/21/2022), or if symptoms worsen or fail to improve.        Signature:  Sherri Johnson, FNP      [address]  [facility] [place] [address]    Date of encounter: 7/21/22

## 2022-08-09 DIAGNOSIS — Z71.89 COMPLEX CARE COORDINATION: ICD-10-CM

## 2022-09-20 ENCOUNTER — OFFICE VISIT (OUTPATIENT)
Dept: FAMILY MEDICINE | Facility: CLINIC | Age: 67
End: 2022-09-20
Payer: MEDICARE

## 2022-09-20 VITALS
WEIGHT: 216 LBS | TEMPERATURE: 98 F | OXYGEN SATURATION: 99 % | BODY MASS INDEX: 31.99 KG/M2 | SYSTOLIC BLOOD PRESSURE: 142 MMHG | RESPIRATION RATE: 20 BRPM | HEIGHT: 69 IN | DIASTOLIC BLOOD PRESSURE: 80 MMHG | HEART RATE: 66 BPM

## 2022-09-20 DIAGNOSIS — Z00.00 WELCOME TO MEDICARE PREVENTIVE VISIT: Primary | ICD-10-CM

## 2022-09-20 DIAGNOSIS — E66.3 OVERWEIGHT: ICD-10-CM

## 2022-09-20 DIAGNOSIS — I10 HYPERTENSION, UNSPECIFIED TYPE: ICD-10-CM

## 2022-09-20 DIAGNOSIS — E78.5 HYPERLIPIDEMIA, UNSPECIFIED HYPERLIPIDEMIA TYPE: ICD-10-CM

## 2022-09-20 DIAGNOSIS — Z12.11 SCREENING FOR MALIGNANT NEOPLASM OF COLON: ICD-10-CM

## 2022-09-20 PROCEDURE — G0402 PR WELCOME MEDICARE PREVENTIVE VISIT NEW ENROLLEE: ICD-10-PCS | Mod: ,,, | Performed by: NURSE PRACTITIONER

## 2022-09-20 PROCEDURE — G0402 INITIAL PREVENTIVE EXAM: HCPCS | Mod: ,,, | Performed by: NURSE PRACTITIONER

## 2022-09-20 RX ORDER — ROSUVASTATIN CALCIUM 5 MG/1
5 TABLET, COATED ORAL DAILY
Qty: 90 TABLET | Refills: 3 | Status: SHIPPED | OUTPATIENT
Start: 2022-09-20 | End: 2022-12-09 | Stop reason: SDUPTHER

## 2022-09-20 NOTE — PATIENT INSTRUCTIONS
Counseling and Referral of Other Preventative  (Italic type indicates deductible and co-insurance are waived)    Patient Name: Joey Hughes  Today's Date: 9/20/2022    Health Maintenance       Date Due Completion Date    Colorectal Cancer Screening 12/20/2022 (Originally 1955) 9/27/2016    COVID-19 Vaccine (3 - Booster for Moderna series) 03/20/2023 (Originally 7/24/2021) 2/24/2021    Influenza Vaccine (1) 06/30/2023 (Originally 9/1/2022) ---    TETANUS VACCINE 09/20/2023 (Originally 5/20/1975) ---    Shingles Vaccine (1 of 2) 09/20/2023 (Originally 5/20/2007) ---    Pneumococcal Vaccines (Age 65+) (1 - PCV) 09/20/2023 (Originally 5/20/2022) ---    Lipid Panel 06/09/2027 6/9/2022        Orders Placed This Encounter   Procedures    Ambulatory referral/consult to Gastroenterology       The following information is provided to all patients.  This information is to help you find resources for any of the problems found today that may be affecting your health:                Living healthy guide: www.[facility].[place].[facility]      Understanding Diabetes: www.diabetes.org      Eating healthy: www.cdc.gov/healthyweight      CDC home safety checklist: www.cdc.gov/steadi/patient.html      Agency on Aging: www.goea.[place].[facility]      Alcoholics anonymous (AA): www.aa.org      Physical Activity: www.tena.nih.gov/hf5ledq      Tobacco use: www.quitwithusla.org

## 2022-09-20 NOTE — PROGRESS NOTES
[facility] TERI SAUL [facility]       PATIENT NAME: Joey Hughes   : 1955    AGE: 67 y.o. DATE: 2022   MRN: [id number]        Reason for Visit / Chief Complaint: Medicare IPPE (WELCOME TO MEDICARE WELLNESS VISIT)        Joey Hughes presents for an Welcome to Medicare AWV today.     The following components were reviewed and updated:    Medical/Social/Family History:  Past Medical History:   Diagnosis Date    High cholesterol     Hypertension         History reviewed. No pertinent family history.     Social History     Tobacco Use   Smoking Status Never   Smokeless Tobacco Never      Social History     Substance and Sexual Activity   Alcohol Use Yes    Alcohol/week: 6.0 standard drinks    Types: 6 Cans of beer per week    Comment: Social drinking on weekends       History reviewed. No pertinent family history.    History reviewed. No pertinent surgical history.      Allergies and Current Medications   Review of patient's allergies indicates:  No Known Allergies    Current Outpatient Medications:     amLODIPine (NORVASC) 10 MG tablet, Take 1 tablet (10 mg total) by mouth once daily., Disp: 90 tablet, Rfl: 1    losartan-hydrochlorothiazide 100-25 mg (HYZAAR) 100-25 mg per tablet, Take 1 tablet by mouth once daily., Disp: 90 tablet, Rfl: 3    rosuvastatin (CRESTOR) 5 MG tablet, Take 1 tablet (5 mg total) by mouth once daily., Disp: 90 tablet, Rfl: 3    Health Risk Assessment   Fall Risk: No   Obesity: BMI 31.90     Advance Directive:  Does not have an advanced directive. Verbal education and written education included in today's AVS.  I offered to discuss advanced care planning, including how to pick a person who would make decisions for you if you were unable to make them for yourself, called a health care power of , and what kind of decisions you might make such as use of life sustaining treatments such as ventilators and tube feeding when faced with a  life limiting illness recorded on a living will that they will need to know. (How you want to be cared for as you near the end of your natural life)   X Patient is interested in learning more about how to make advanced directives.  I provided them paperwork and offered to discuss this with them.   Depression: PHQ9 -1    HTN: 142/80    T2DM: N/A If yes, diabetic diet, glucose monitoring, activity level, weight management, med compliance, and follow-up discussed.   Tobacco use: Denies tobacco use  STI: Not at risk    Alcohol misuse: Reports occasional drinking on the weekend   Statin Use: Continue statin use. Encouraged heart healthy diet & exercise   Mini Co/5      Health Risk Assessment  What is your age?: 65-69  Are you male or female?: Male  During the past four weeks, how much have you been bothered by emotional problems such as feeling anxious, depressed, irritable, sad, or downhearted and blue?: Not at all  During the past five weeks, has your physical and/or emotional health limited your social activities with family, friends, neighbors, or groups?: Not at all  During the past four weeks, how much bodily pain have you generally had?: No pain  During the past four weeks, was someone available to help if you needed and wanted help?: Yes, as much as I wanted  During the past four weeks, what was the hardest physical activity you could do for at least two minutes?: Heavy  Can you get to places out of walking distance without help?  (For example, can you travel alone on buses or taxis, or drive your own car?): Yes  Can you go shopping for groceries or clothes without someone's help?: Yes  Can you prepare your own meals?: Yes  Can you do your own housework without help?: Yes  Because of any health problems, do you need the help of another person with your personal care needs such as eating, bathing, dressing, or getting around the house?: No  Can you handle your own money without help?: Yes  During the past four  weeks, how would you rate your health in general?: Very good  How have things been going for you during the past four weeks?: Pretty well  Are you having difficulties driving your car?: Not applicable, I do not use a car  Do you always fasten your seat belt when you are in a car?: Yes, usually  How often in the past four weeks have you been bothered by falling or dizzy when standing up?: Never  How often in the past four weeks have you been bothered by sexual problems?: Never  How often in the past four weeks have you been bothered by trouble eating well?: Never  How often in the past four weeks have you been bothered by teeth or denture problems?: Sometimes  How often in the past four weeks have you been bothered with problems using the telephone?: Never  How often in the past four weeks have you been bothered by tiredness or fatigue?: Sometimes  Have you fallen two or more times in the past year?: No  Are you afraid of falling?: No  Are you a smoker?: No  During the past four weeks, how many drinks of wine, beer, or other alcoholic beverages did you have?: 6-9 drinks per week  Do you exercise for about 20 minutes three or more days a week?: Yes, some of the time  Have you been given any information to help you with hazards in your house that might hurt you?: Yes  Have you been given any information to help you with keeping track of your medications?: Yes  How often do you have trouble taking medicines the way you've been told to take them?: I always take them as prescribed  How confident are you that you can control and manage most of your health problems?: Very confident  What is your race? (Check all that apply.):     Opioid Risk Assessment:     Opioid Risk Score         Value Time User    Opioid Risk Score  0 9/20/2022  2:20 PM Akua Solo, ALY             Health Maintenance   Last eye exam: N/A   Last CV screen with lipids: 06/09/2022   Diabetes screening with fasting glucose or A1c:  2022   Colonoscopy: 2016- Repeat in 5 years. Due again. Discussed with pt & ordered   Flu Vaccine: Declines   Pneumonia vaccines: Declines   COVID vaccine: 2021; 2021   Hep B vaccine: not at risk   DEXA: N/A   Last pap/pelvic: N/A   Last Mammogram: N/A   Last PSA screen: 2022- Normal   AAA screening: N/A (once in lifetime for males 65-75 who have smoked > 100 cigarettes in lifetime)  HIV Screein2022- nonreactive  Hepatitis C Screen: 2022- Nonreative  Low Dose CT Scan: N/A    Health Maintenance Topics with due status: Not Due       Topic Last Completion Date    Lipid Panel 2022     There are no preventive care reminders to display for this patient.    Incontinence  Bowel: No  Bladder: No    Lab results available in Epic or see dates from [facility] above:   Lab Results   Component Value Date    CHOL 217 (H) 2022    CHOL 232 (H) 2022     Lab Results   Component Value Date    HDL 56 2022    HDL 59 2022     Lab Results   Component Value Date    LDLCALC 120 2022    LDLCALC 123 2022     Lab Results   Component Value Date    TRIG 204 (H) 2022    TRIG 248 (H) 2022     Lab Results   Component Value Date    CHOLHDL 3.9 2022    CHOLHDL 3.9 2022       Lab Results   Component Value Date    HGBA1C 6.1 2022       Sodium   Date Value Ref Range Status   2022 140 136 - 145 mmol/L Final     Potassium   Date Value Ref Range Status   2022 4.1 3.5 - 5.1 mmol/L Final     Chloride   Date Value Ref Range Status   2022 107 98 - 107 mmol/L Final     CO2   Date Value Ref Range Status   2022 26 21 - 32 mmol/L Final     Glucose   Date Value Ref Range Status   2022 117 (H) 74 - 106 mg/dL Final     BUN   Date Value Ref Range Status   2022 16 7 - 18 mg/dL Final     Creatinine   Date Value Ref Range Status   2022 0.97 0.70 - 1.30 mg/dL Final     Calcium   Date Value Ref Range Status   2022 8.8  "8.5 - 10.1 mg/dL Final     Total Protein   Date Value Ref Range Status   06/09/2022 6.9 6.4 - 8.2 g/dL Final     Albumin   Date Value Ref Range Status   06/09/2022 4.0 3.5 - 5.0 g/dL Final     Bilirubin, Total   Date Value Ref Range Status   06/09/2022 0.5 0.0 - 1.2 mg/dL Final     Alk Phos   Date Value Ref Range Status   06/09/2022 56 45 - 115 U/L Final     AST   Date Value Ref Range Status   06/09/2022 22 15 - 37 U/L Final     ALT   Date Value Ref Range Status   06/09/2022 36 16 - 61 U/L Final     Anion Gap   Date Value Ref Range Status   06/09/2022 11 7 - 16 mmol/L Final     eGFR   Date Value Ref Range Status   06/09/2022 83 >=60 mL/min/1.73m² Final         Lab Results   Component Value Date    PSA 0.276 02/16/2022          Care Team   PCP: LACEY Narayanan        **See Completed Assessments for Annual Wellness visit within the encounter summary    The following assessments were completed & reviewed:  Depression Screening  Cognitive function Screening  Timed Get Up Test  Whisper Test  Vision Screen  Health Risk Assessment  Checklist of ADLs and IADLs      Objective  Vitals:    09/20/22 1417   BP: (!) 142/80   Pulse: 66   Resp: 20   Temp: 98.1 °F (36.7 °C)   TempSrc: Oral   SpO2: 99%   Weight: 98 kg (216 lb)   Height: 5' 9" (1.753 m)   PainSc: 0-No pain      Body mass index is 31.9 kg/m².  Ideal body weight: 70.7 kg (155 lb 13.8 oz)       Physical Exam  Vitals and nursing note reviewed.   Constitutional:       Appearance: Normal appearance.   HENT:      Head: Normocephalic.      Right Ear: Tympanic membrane normal.      Left Ear: Tympanic membrane normal.      Nose: Nose normal.      Mouth/Throat:      Mouth: Mucous membranes are moist.      Pharynx: Oropharynx is clear.   Eyes:      Conjunctiva/sclera: Conjunctivae normal.      Pupils: Pupils are equal, round, and reactive to light.   Cardiovascular:      Rate and Rhythm: Normal rate and regular rhythm.      Pulses: Normal pulses.      Heart sounds: Normal " heart sounds.   Pulmonary:      Effort: Pulmonary effort is normal.      Breath sounds: Normal breath sounds.   Abdominal:      General: Bowel sounds are normal.      Palpations: Abdomen is soft.   Musculoskeletal:         General: Normal range of motion.      Cervical back: Normal range of motion and neck supple.   Skin:     General: Skin is warm.      Capillary Refill: Capillary refill takes less than 2 seconds.   Neurological:      General: No focal deficit present.      Mental Status: He is alert and oriented to person, place, and time.   Psychiatric:         Mood and Affect: Mood normal.         Thought Content: Thought content normal.         Assessment:     1. Welcome to Medicare preventive visit    2. Hypertension, unspecified type    3. Hyperlipidemia, unspecified hyperlipidemia type  - rosuvastatin (CRESTOR) 5 MG tablet; Take 1 tablet (5 mg total) by mouth once daily.  Dispense: 90 tablet; Refill: 3    4. BMI 31.0-31.9,adult    5. Screening for malignant neoplasm of colon  - Ambulatory referral/consult to Gastroenterology; Future    6. Overweight         Plan:    Referrals/Orders:  Colonoscopy     Advised to call office if does not hear from anyone with referral appt within 2-3 weeks to check on status of referral. Voiced understanding.    Keep current on appts & continue medications as ordered. Prevent falls by wearing good non-skid shoes. Encouraged diet & exercise to decrease weight/BMI.      Discussed and provided with a screening schedule and personal prevention plan in accordance with USPSTF age appropriate recommendations and Medicare screening guidelines.   Education, counseling, and referrals were provided as needed.  After Visit Summary printed and given to patient which includes written education and a list of any referrals if indicated. All education discussed with patient & handouts given to patient. Opoid risk tool performed.  Patient is LOW risk for opoid abuse.       F/u plan for yearly  AWV.    Signature: JESSICA Del Angel

## 2022-12-12 ENCOUNTER — OFFICE VISIT (OUTPATIENT)
Dept: FAMILY MEDICINE | Facility: CLINIC | Age: 67
End: 2022-12-12
Payer: COMMERCIAL

## 2022-12-12 VITALS
TEMPERATURE: 98 F | OXYGEN SATURATION: 100 % | DIASTOLIC BLOOD PRESSURE: 84 MMHG | SYSTOLIC BLOOD PRESSURE: 191 MMHG | BODY MASS INDEX: 32.58 KG/M2 | HEIGHT: 69 IN | HEART RATE: 63 BPM | WEIGHT: 220 LBS | RESPIRATION RATE: 18 BRPM

## 2022-12-12 DIAGNOSIS — E78.5 HYPERLIPIDEMIA, UNSPECIFIED HYPERLIPIDEMIA TYPE: ICD-10-CM

## 2022-12-12 DIAGNOSIS — Z23 PNEUMOCOCCAL VACCINATION ADMINISTERED AT CURRENT VISIT: ICD-10-CM

## 2022-12-12 DIAGNOSIS — Z12.11 SCREEN FOR COLON CANCER: ICD-10-CM

## 2022-12-12 DIAGNOSIS — I10 HYPERTENSION, UNSPECIFIED TYPE: Primary | ICD-10-CM

## 2022-12-12 LAB
ALBUMIN SERPL BCP-MCNC: 4.2 G/DL (ref 3.5–5)
ALBUMIN/GLOB SERPL: 1.4 {RATIO}
ALP SERPL-CCNC: 59 U/L (ref 45–115)
ALT SERPL W P-5'-P-CCNC: 32 U/L (ref 16–61)
ANION GAP SERPL CALCULATED.3IONS-SCNC: 9 MMOL/L (ref 7–16)
AST SERPL W P-5'-P-CCNC: 21 U/L (ref 15–37)
BILIRUB SERPL-MCNC: 0.5 MG/DL (ref ?–1.2)
BUN SERPL-MCNC: 16 MG/DL (ref 7–18)
BUN/CREAT SERPL: 16 (ref 6–20)
CALCIUM SERPL-MCNC: 9.3 MG/DL (ref 8.5–10.1)
CHLORIDE SERPL-SCNC: 108 MMOL/L (ref 98–107)
CHOLEST SERPL-MCNC: 229 MG/DL (ref 0–200)
CHOLEST/HDLC SERPL: 3.6 {RATIO}
CO2 SERPL-SCNC: 29 MMOL/L (ref 21–32)
CREAT SERPL-MCNC: 1.03 MG/DL (ref 0.7–1.3)
EGFR (NO RACE VARIABLE) (RUSH/TITUS): 81 ML/MIN/1.73M²
GLOBULIN SER-MCNC: 3 G/DL (ref 2–4)
GLUCOSE SERPL-MCNC: 126 MG/DL (ref 74–106)
HDLC SERPL-MCNC: 63 MG/DL (ref 40–60)
LDLC SERPL CALC-MCNC: 137 MG/DL
LDLC/HDLC SERPL: 2.2 {RATIO}
NONHDLC SERPL-MCNC: 166 MG/DL
POTASSIUM SERPL-SCNC: 4.5 MMOL/L (ref 3.5–5.1)
PROT SERPL-MCNC: 7.2 G/DL (ref 6.4–8.2)
SODIUM SERPL-SCNC: 141 MMOL/L (ref 136–145)
TRIGL SERPL-MCNC: 145 MG/DL (ref 35–150)
VLDLC SERPL-MCNC: 29 MG/DL

## 2022-12-12 PROCEDURE — 3061F PR NEG MICROALBUMINURIA RESULT DOCUMENTED/REVIEW: ICD-10-PCS | Mod: ,,, | Performed by: NURSE PRACTITIONER

## 2022-12-12 PROCEDURE — 3061F NEG MICROALBUMINURIA REV: CPT | Mod: ,,, | Performed by: NURSE PRACTITIONER

## 2022-12-12 PROCEDURE — 3066F PR DOCUMENTATION OF TREATMENT FOR NEPHROPATHY: ICD-10-PCS | Mod: ,,, | Performed by: NURSE PRACTITIONER

## 2022-12-12 PROCEDURE — 1159F PR MEDICATION LIST DOCUMENTED IN MEDICAL RECORD: ICD-10-PCS | Mod: ,,, | Performed by: NURSE PRACTITIONER

## 2022-12-12 PROCEDURE — 99214 PR OFFICE/OUTPT VISIT, EST, LEVL IV, 30-39 MIN: ICD-10-PCS | Mod: 25,,, | Performed by: NURSE PRACTITIONER

## 2022-12-12 PROCEDURE — 1159F MED LIST DOCD IN RCRD: CPT | Mod: ,,, | Performed by: NURSE PRACTITIONER

## 2022-12-12 PROCEDURE — G0009 PNEUMOCOCCAL CONJUGATE VACCINE 20-VALENT: ICD-10-PCS | Mod: ,,, | Performed by: NURSE PRACTITIONER

## 2022-12-12 PROCEDURE — 3008F BODY MASS INDEX DOCD: CPT | Mod: ,,, | Performed by: NURSE PRACTITIONER

## 2022-12-12 PROCEDURE — 3044F PR MOST RECENT HEMOGLOBIN A1C LEVEL <7.0%: ICD-10-PCS | Mod: ,,, | Performed by: NURSE PRACTITIONER

## 2022-12-12 PROCEDURE — 3077F PR MOST RECENT SYSTOLIC BLOOD PRESSURE >= 140 MM HG: ICD-10-PCS | Mod: ,,, | Performed by: NURSE PRACTITIONER

## 2022-12-12 PROCEDURE — 80053 COMPREHENSIVE METABOLIC PANEL: ICD-10-PCS | Mod: ,,, | Performed by: CLINICAL MEDICAL LABORATORY

## 2022-12-12 PROCEDURE — 90677 PNEUMOCOCCAL CONJUGATE VACCINE 20-VALENT: ICD-10-PCS | Mod: ,,, | Performed by: NURSE PRACTITIONER

## 2022-12-12 PROCEDURE — 99214 OFFICE O/P EST MOD 30 MIN: CPT | Mod: 25,,, | Performed by: NURSE PRACTITIONER

## 2022-12-12 PROCEDURE — 3079F DIAST BP 80-89 MM HG: CPT | Mod: ,,, | Performed by: NURSE PRACTITIONER

## 2022-12-12 PROCEDURE — 80061 LIPID PANEL: ICD-10-PCS | Mod: ,,, | Performed by: CLINICAL MEDICAL LABORATORY

## 2022-12-12 PROCEDURE — 3066F NEPHROPATHY DOC TX: CPT | Mod: ,,, | Performed by: NURSE PRACTITIONER

## 2022-12-12 PROCEDURE — 80061 LIPID PANEL: CPT | Mod: ,,, | Performed by: CLINICAL MEDICAL LABORATORY

## 2022-12-12 PROCEDURE — 3008F PR BODY MASS INDEX (BMI) DOCUMENTED: ICD-10-PCS | Mod: ,,, | Performed by: NURSE PRACTITIONER

## 2022-12-12 PROCEDURE — G0009 ADMIN PNEUMOCOCCAL VACCINE: HCPCS | Mod: ,,, | Performed by: NURSE PRACTITIONER

## 2022-12-12 PROCEDURE — 90677 PCV20 VACCINE IM: CPT | Mod: ,,, | Performed by: NURSE PRACTITIONER

## 2022-12-12 PROCEDURE — 80053 COMPREHEN METABOLIC PANEL: CPT | Mod: ,,, | Performed by: CLINICAL MEDICAL LABORATORY

## 2022-12-12 PROCEDURE — 3079F PR MOST RECENT DIASTOLIC BLOOD PRESSURE 80-89 MM HG: ICD-10-PCS | Mod: ,,, | Performed by: NURSE PRACTITIONER

## 2022-12-12 PROCEDURE — 3044F HG A1C LEVEL LT 7.0%: CPT | Mod: ,,, | Performed by: NURSE PRACTITIONER

## 2022-12-12 PROCEDURE — 3077F SYST BP >= 140 MM HG: CPT | Mod: ,,, | Performed by: NURSE PRACTITIONER

## 2022-12-12 RX ORDER — ROSUVASTATIN CALCIUM 5 MG/1
5 TABLET, COATED ORAL DAILY
Qty: 90 TABLET | Refills: 1 | Status: SHIPPED | OUTPATIENT
Start: 2022-12-12 | End: 2023-07-05 | Stop reason: SDUPTHER

## 2022-12-12 RX ORDER — AMLODIPINE BESYLATE 10 MG/1
10 TABLET ORAL DAILY
Qty: 90 TABLET | Refills: 1 | Status: SHIPPED | OUTPATIENT
Start: 2022-12-12 | End: 2023-07-05 | Stop reason: SDUPTHER

## 2022-12-12 RX ORDER — LOSARTAN POTASSIUM AND HYDROCHLOROTHIAZIDE 25; 100 MG/1; MG/1
1 TABLET ORAL DAILY
Qty: 90 TABLET | Refills: 1 | Status: SHIPPED | OUTPATIENT
Start: 2022-12-12 | End: 2023-07-05 | Stop reason: SDUPTHER

## 2022-12-13 NOTE — PROGRESS NOTES
BRUNO Cohn   [facility] [address] [place] [address]  [phone]      PATIENT NAME: Joey Hughes  : 1955  DATE: 22  MRN: [id number]      Billing Provider: BRUNO Cohn  Level of Service:   Patient PCP Information       Provider PCP Type    BRUNO Cohn General            Reason for Visit / Chief Complaint: Hypertension and Hyperlipidemia       Update PCP  Update Chief Complaint         History of Present Illness / Problem Focused Workflow     Joey Hughes presents to the clinic with Hypertension and Hyperlipidemia     Routine follow up with lab and med refills. Overall reports doing well.    Bp elevated, pt has been out of med for several day.   Advised to monitor BP at home. Advised on optimal BP readings - SBP < 130 & DBP < 80. Advised to call office for any persistent BP elevation and may have to prescribe or adjust BP med(s).  Recommended DASH diet, stay well hydrated with water daily, eliminate or decrease caffeinated and high calorie drinks, increase physical activity, and lose weight if BMI > 25.0. Bring log to next appt        Review of Systems     Review of Systems   Constitutional:  Negative for fatigue and fever.   HENT:  Negative for nasal congestion and sore throat.    Eyes:  Negative for visual disturbance.   Respiratory:  Negative for chest tightness and shortness of breath.    Cardiovascular:  Negative for chest pain and leg swelling.   Gastrointestinal:  Negative for abdominal pain, change in bowel habit and change in bowel habit.   Endocrine: Negative for polydipsia, polyphagia and polyuria.   Genitourinary:  Negative for dysuria and hematuria.   Musculoskeletal:  Negative for back pain and leg pain.   Integumentary:  Negative for rash.   Neurological:  Negative for dizziness, syncope, weakness and light-headedness.      Medical / Social / Family  History     Past Medical History:   Diagnosis Date    High cholesterol     Hypertension        No past surgical history on file.    Social History  Mr. Hughes  reports that he has never smoked. He has never used smokeless tobacco. He reports current alcohol use of about 6.0 standard drinks per week. He reports that he does not use drugs.    Family History  Mr. Hughes's family history is not on file.    Medications and Allergies     Medications  No outpatient medications have been marked as taking for the 12/12/22 encounter (Office Visit) with BRUNO Cohn.       Allergies  Review of patient's allergies indicates:  No Known Allergies    Physical Examination     Vitals:    12/12/22 1331   BP: (!) 191/84   Pulse: 63   Resp: 18   Temp: 98 °F (36.7 °C)     Physical Exam  Eyes:      Pupils: Pupils are equal, round, and reactive to light.   Cardiovascular:      Rate and Rhythm: Normal rate and regular rhythm.      Heart sounds: Normal heart sounds. No murmur heard.  Pulmonary:      Breath sounds: Normal breath sounds. No wheezing, rhonchi or rales.   Abdominal:      General: Bowel sounds are normal.   Musculoskeletal:         General: No swelling.      Cervical back: Normal range of motion and neck supple.   Skin:     General: Skin is warm and dry.   Neurological:      Mental Status: He is alert and oriented to person, place, and time.        Assessment and Plan (including Health Maintenance)      Problem List  Smart Sets  Document Outside HM   :    Plan:   Restart home meds  Bp goal 130/80      There are no preventive care reminders to display for this patient.    Problem List Items Addressed This Visit          Cardiac/Vascular    Hypertension - Primary    Relevant Medications    amLODIPine (NORVASC) 10 MG tablet    losartan-hydrochlorothiazide 100-25 mg (HYZAAR) 100-25 mg per tablet    Other Relevant Orders    Comprehensive Metabolic Panel (Completed)    Hyperlipidemia    Relevant Medications     rosuvastatin (CRESTOR) 5 MG tablet    Other Relevant Orders    Lipid Panel (Completed)     Other Visit Diagnoses       Screen for colon cancer        Relevant Orders    Ambulatory referral/consult to Gastroenterology    Pneumococcal vaccination administered at current visit        Relevant Orders    Pneumococcal Conjugate Vaccine (20 Valent) (IM) (Completed)            Health Maintenance Topics with due status: Not Due       Topic Last Completion Date    PROSTATE-SPECIFIC ANTIGEN 02/16/2022    Diabetes Urine Screening 06/09/2022    Hemoglobin A1c 06/09/2022    Lipid Panel 12/12/2022       Future Appointments   Date Time Provider Department Center   3/14/2023  1:00 PM BRUNO Cohn [facility] Hwang   9/21/2023  1:00 PM AWVASU NURSE, [facility] Hwang            Signature:  BRUNO Cohn [facility] [address] [address]  [phone]    Date of encounter: 12/12/22

## 2022-12-15 ENCOUNTER — TELEPHONE (OUTPATIENT)
Dept: FAMILY MEDICINE | Facility: CLINIC | Age: 67
End: 2022-12-15
Payer: COMMERCIAL

## 2022-12-15 DIAGNOSIS — Z12.11 SCREENING FOR COLON CANCER: Primary | ICD-10-CM

## 2022-12-15 RX ORDER — POLYETHYLENE GLYCOL 3350, SODIUM SULFATE ANHYDROUS, SODIUM BICARBONATE, SODIUM CHLORIDE, POTASSIUM CHLORIDE 236; 22.74; 6.74; 5.86; 2.97 G/4L; G/4L; G/4L; G/4L; G/4L
4 POWDER, FOR SOLUTION ORAL ONCE
Qty: 4000 ML | Refills: 0 | Status: SHIPPED | OUTPATIENT
Start: 2022-12-15 | End: 2022-12-15

## 2022-12-19 ENCOUNTER — TELEPHONE (OUTPATIENT)
Dept: FAMILY MEDICINE | Facility: CLINIC | Age: 67
End: 2022-12-19

## 2022-12-22 DIAGNOSIS — Z12.11 COLON CANCER SCREENING: Primary | ICD-10-CM

## 2022-12-22 RX ORDER — POLYETHYLENE GLYCOL 3350, SODIUM SULFATE ANHYDROUS, SODIUM BICARBONATE, SODIUM CHLORIDE, POTASSIUM CHLORIDE 236; 22.74; 6.74; 5.86; 2.97 G/4L; G/4L; G/4L; G/4L; G/4L
4 POWDER, FOR SOLUTION ORAL ONCE
Qty: 4000 ML | Refills: 0 | Status: SHIPPED | OUTPATIENT
Start: 2022-12-22 | End: 2022-12-22

## 2023-02-14 ENCOUNTER — TELEPHONE (OUTPATIENT)
Dept: FAMILY MEDICINE | Facility: CLINIC | Age: 68
End: 2023-02-14
Payer: COMMERCIAL

## 2023-02-14 NOTE — TELEPHONE ENCOUNTER
----- Message from Zekeiah Ormond sent at 2/10/2023 10:54 AM CST -----  Patient asked for some antibiotics for an abscess.

## 2023-02-22 ENCOUNTER — ANESTHESIA EVENT (OUTPATIENT)
Dept: GASTROENTEROLOGY | Facility: HOSPITAL | Age: 68
End: 2023-02-22
Payer: COMMERCIAL

## 2023-02-22 ENCOUNTER — HOSPITAL ENCOUNTER (OUTPATIENT)
Dept: GASTROENTEROLOGY | Facility: HOSPITAL | Age: 68
Discharge: HOME | End: 2023-02-22
Attending: INTERNAL MEDICINE
Payer: COMMERCIAL

## 2023-02-22 ENCOUNTER — ANESTHESIA (OUTPATIENT)
Dept: GASTROENTEROLOGY | Facility: HOSPITAL | Age: 68
End: 2023-02-22
Payer: COMMERCIAL

## 2023-02-22 VITALS
OXYGEN SATURATION: 100 % | DIASTOLIC BLOOD PRESSURE: 83 MMHG | TEMPERATURE: 98 F | SYSTOLIC BLOOD PRESSURE: 154 MMHG | RESPIRATION RATE: 18 BRPM | HEART RATE: 59 BPM

## 2023-02-22 DIAGNOSIS — Z86.010 PERSONAL HISTORY OF COLONIC POLYPS: ICD-10-CM

## 2023-02-22 DIAGNOSIS — Z12.11 SCREENING FOR COLON CANCER: ICD-10-CM

## 2023-02-22 PROBLEM — Z86.0100 PERSONAL HISTORY OF COLONIC POLYPS: Status: ACTIVE | Noted: 2023-02-22

## 2023-02-22 PROCEDURE — 45385 COLONOSCOPY W/LESION REMOVAL: CPT | Mod: 33,,, | Performed by: INTERNAL MEDICINE

## 2023-02-22 PROCEDURE — D9220A PRA ANESTHESIA: Mod: PT,,,

## 2023-02-22 PROCEDURE — 45385 PR COLONOSCOPY,REMV LESN,SNARE: ICD-10-PCS | Mod: 33,,, | Performed by: INTERNAL MEDICINE

## 2023-02-22 PROCEDURE — 37000008 HC ANESTHESIA 1ST 15 MINUTES

## 2023-02-22 PROCEDURE — 88305 TISSUE EXAM BY PATHOLOGIST: CPT | Mod: TC,59,SUR | Performed by: INTERNAL MEDICINE

## 2023-02-22 PROCEDURE — 88305 TISSUE EXAM BY PATHOLOGIST: CPT | Mod: 26,,, | Performed by: PATHOLOGY

## 2023-02-22 PROCEDURE — 27201423 OPTIME MED/SURG SUP & DEVICES STERILE SUPPLY

## 2023-02-22 PROCEDURE — 45380 COLONOSCOPY AND BIOPSY: CPT | Mod: PT,59 | Performed by: INTERNAL MEDICINE

## 2023-02-22 PROCEDURE — D9220A PRA ANESTHESIA: ICD-10-PCS | Mod: PT,,,

## 2023-02-22 PROCEDURE — 88305 SURGICAL PATHOLOGY: ICD-10-PCS | Mod: 26,,, | Performed by: PATHOLOGY

## 2023-02-22 PROCEDURE — 45385 COLONOSCOPY W/LESION REMOVAL: CPT | Mod: PT | Performed by: INTERNAL MEDICINE

## 2023-02-22 PROCEDURE — 63600175 PHARM REV CODE 636 W HCPCS

## 2023-02-22 PROCEDURE — 45380 COLONOSCOPY AND BIOPSY: CPT | Mod: 33,59,, | Performed by: INTERNAL MEDICINE

## 2023-02-22 PROCEDURE — 25000003 PHARM REV CODE 250

## 2023-02-22 PROCEDURE — 45380 PR COLONOSCOPY,BIOPSY: ICD-10-PCS | Mod: 33,59,, | Performed by: INTERNAL MEDICINE

## 2023-02-22 PROCEDURE — 37000009 HC ANESTHESIA EA ADD 15 MINS

## 2023-02-22 RX ORDER — LIDOCAINE HYDROCHLORIDE 20 MG/ML
INJECTION, SOLUTION EPIDURAL; INFILTRATION; INTRACAUDAL; PERINEURAL
Status: DISCONTINUED | OUTPATIENT
Start: 2023-02-22 | End: 2023-02-22

## 2023-02-22 RX ORDER — SODIUM CHLORIDE 9 MG/ML
INJECTION, SOLUTION INTRAVENOUS CONTINUOUS PRN
Status: DISCONTINUED | OUTPATIENT
Start: 2023-02-22 | End: 2023-02-22

## 2023-02-22 RX ORDER — PROPOFOL 10 MG/ML
VIAL (ML) INTRAVENOUS
Status: DISCONTINUED | OUTPATIENT
Start: 2023-02-22 | End: 2023-02-22

## 2023-02-22 RX ADMIN — LIDOCAINE HYDROCHLORIDE 100 MG: 20 INJECTION, SOLUTION EPIDURAL; INFILTRATION; INTRACAUDAL; PERINEURAL at 12:02

## 2023-02-22 RX ADMIN — PROPOFOL 40 MG: 10 INJECTION, EMULSION INTRAVENOUS at 12:02

## 2023-02-22 RX ADMIN — PROPOFOL 40 MG: 10 INJECTION, EMULSION INTRAVENOUS at 01:02

## 2023-02-22 RX ADMIN — PROPOFOL 120 MG: 10 INJECTION, EMULSION INTRAVENOUS at 12:02

## 2023-02-22 RX ADMIN — SODIUM CHLORIDE: 9 INJECTION, SOLUTION INTRAVENOUS at 12:02

## 2023-02-22 NOTE — H&P
Gastroenterology Pre-procedure H&P    Chief Complaint: Personal history of colonic polyps    History of Present Illness    Joey Hughes is a 67 y.o. male that  has a past medical history of High cholesterol and Hypertension.     Patient here for routine surveillance    Patient denies wt loss, abdominal pain, diarrhea, melena/hematochezia, change in stool caliber, no anticoagulants, FMHx of GI related malignancies.      Past Medical History:   Diagnosis Date    High cholesterol     Hypertension        History reviewed. No pertinent surgical history.    History reviewed. No pertinent family history.    Social History     Socioeconomic History    Marital status: Single   Tobacco Use    Smoking status: Never    Smokeless tobacco: Never   Substance and Sexual Activity    Alcohol use: Yes     Alcohol/week: 6.0 standard drinks     Types: 6 Cans of beer per week     Comment: Social drinking on weekends    Drug use: Never    Sexual activity: Not Currently       Current Outpatient Medications   Medication Sig Dispense Refill    amLODIPine (NORVASC) 10 MG tablet Take 1 tablet (10 mg total) by mouth once daily. 90 tablet 1    losartan-hydrochlorothiazide 100-25 mg (HYZAAR) 100-25 mg per tablet Take 1 tablet by mouth once daily. 90 tablet 1    rosuvastatin (CRESTOR) 5 MG tablet Take 1 tablet (5 mg total) by mouth once daily. 90 tablet 1     No current facility-administered medications for this encounter.       Review of patient's allergies indicates:  No Known Allergies    Objective:  Vitals:    02/22/23 1101   BP: (!) 170/75   Pulse: 70   Resp: 11   SpO2: 99%        GEN: normal appearing, NAD, AAO x3  HENT: NCAT, anicteric, OP benign  CV: normal rate, regular rhythm  RESP: NABS, symmetric rise, unlabored  ABD: soft, ND, no guarding or TTP  SKIN: warm and dry  NEURO: grossly afocal    Assessment and Plan:    Proceed with:    Colonoscopy for previous adenomatous polyp, surveillance    Miko Quan MD  Gastroenterology

## 2023-02-22 NOTE — ANESTHESIA PREPROCEDURE EVALUATION
02/22/2023  Joey Hughes is a 67 y.o., male.      Pre-op Assessment    I have reviewed the Patient Summary Reports.     I have reviewed the Nursing Notes. I have reviewed the NPO Status.   I have reviewed the Medications.     Review of Systems  Anesthesia Hx:  No problems with previous Anesthesia    Social:  Non-Smoker, No Alcohol Use    Hematology/Oncology:  Hematology Normal   Oncology Normal     EENT/Dental:EENT/Dental Normal   Cardiovascular:   Hypertension hyperlipidemia    Pulmonary:  Pulmonary Normal    Renal/:  Renal/ Normal     Hepatic/GI:  Hepatic/GI Normal    Musculoskeletal:  Musculoskeletal Normal    Neurological:  Neurology Normal    Endocrine:  Endocrine Normal  Obesity / BMI > 30  Dermatological:  Skin Normal    Psych:  Psychiatric Normal           Physical Exam  General: Well nourished, Cooperative, Alert and Oriented    Airway:  Mallampati: II   Mouth Opening: Normal  TM Distance: Normal  Tongue: Normal  Neck ROM: Normal ROM    Dental:  Intact    Chest/Lungs:  Clear to auscultation, Normal Respiratory Rate    Heart:  Rate: Normal  Rhythm: Regular Rhythm  Sounds: Normal    Abdomen:  Normal, Soft, Nontender        Anesthesia Plan  Type of Anesthesia, risks & benefits discussed:    Anesthesia Type: Gen Natural Airway, MAC  Intra-op Monitoring Plan: Standard ASA Monitors  Post Op Pain Control Plan: multimodal analgesia and IV/PO Opioids PRN  Induction:  IV  Informed Consent: Informed consent signed with the Patient and all parties understand the risks and agree with anesthesia plan.  All questions answered.   ASA Score: 2  Day of Surgery Review of History & Physical: I have interviewed and examined the patient. I have reviewed the patient's H&P dated:     Ready For Surgery From Anesthesia Perspective.     .       Problem: Falls - Risk of  Goal: *Absence of Falls  Description: Document Avtar Solorzano Fall Risk and appropriate interventions in the flowsheet.   11/13/2020 1157 by Dominick Mccord  Outcome: Progressing Towards Goal  Note: Fall Risk Interventions:            Medication Interventions: Patient to call before getting OOB, Teach patient to arise slowly                11/13/2020 1142 by Dominick Mccord  Outcome: Progressing Towards Goal  Note: Fall Risk Interventions:            Medication Interventions: Patient to call before getting OOB, Teach patient to arise slowly                   Problem: Patient Education: Go to Patient Education Activity  Goal: Patient/Family Education  11/13/2020 1157 by Dominick Mccord  Outcome: Progressing Towards Goal  11/13/2020 1142 by Dominick Mccord  Outcome: Progressing Towards Goal

## 2023-02-22 NOTE — TRANSFER OF CARE
Anesthesia Transfer of Care Note    Patient: Joey Hughes    Procedure(s) Performed: colonoscopy    Patient location: GI    Anesthesia Type: general and MAC    Transport from OR: Transported from OR on room air with adequate spontaneous ventilation    Post pain: adequate analgesia    Post assessment: no apparent anesthetic complications    Post vital signs: stable    Level of consciousness: responds to stimulation    Nausea/Vomiting: no nausea/vomiting    Complications: none    Transfer of care protocol was followed      Last vitals:   Visit Vitals  /63   Pulse 76   Temp 36.6 °C (97.9 °F)   Resp 18   SpO2 100%

## 2023-02-22 NOTE — DISCHARGE INSTRUCTIONS
Procedure Date  2/22/23     Impression  Overall Impression:   - 12-mm semi-pedunculated polyp in the sigmoid colon removed with hot snare  - 4 polyps polyp removed with forceps and cold snare polypectomy  - Grade I internal hemorrhoids  - The exam was otherwise normal     Recommendation  Await pathology results is recommended.  Repeat colonoscopy in 3 years is recommended.     Outcome of procedure: successful Colonoscopy  Disposition: patient to recovery following procedure; discharge to home when appropriate parameters met  Provisions for follow up: please call my office for any unexpected symptoms like chest or abdominal pain or bleeding following your procedure.  Final Diagnosis: colon polyps    THE NURSE WILL CALL YOU WITH YOUR BIOPSY RESULTS IN A FEW DAYS.   NO DRIVING, OPERATING EQUIPMENT, OR SIGNING LEGAL DOCUMENTS FOR 24 HOURS.

## 2023-02-23 LAB
ESTROGEN SERPL-MCNC: NORMAL PG/ML
INSULIN SERPL-ACNC: NORMAL U[IU]/ML
LAB AP GROSS DESCRIPTION: NORMAL
LAB AP LABORATORY NOTES: NORMAL
T3RU NFR SERPL: NORMAL %

## 2023-02-23 NOTE — ANESTHESIA POSTPROCEDURE EVALUATION
Anesthesia Post Evaluation    Patient: Joey Hughes    Procedure(s) Performed: colonoscopy    Final Anesthesia Type: general      Patient location during evaluation: GI PACU  Patient participation: Yes- Able to Participate  Level of consciousness: awake and alert  Post-procedure vital signs: reviewed and stable  Pain management: adequate  Airway patency: patent    PONV status at discharge: No PONV  Anesthetic complications: no      Cardiovascular status: blood pressure returned to baseline  Respiratory status: unassisted and spontaneous ventilation  Hydration status: euvolemic  Follow-up not needed.          Vitals Value Taken Time   /83 02/22/23 1350   Temp 36.6 °C (97.9 °F) 02/22/23 1317   Pulse 61 02/22/23 1353   Resp 18 02/22/23 1353   SpO2 97 % 02/22/23 1353   Vitals shown include unvalidated device data.      Event Time   Out of Recovery 14:01:58         Pain/Alok Score: Alok Score: 10 (2/22/2023  1:27 PM)

## 2023-02-23 NOTE — PROGRESS NOTES
Mrs. Smith, thank you for referring this patient to me. I recommend repeat colonoscopy in 3 years. Please let me know if you have any questions regarding this patient.

## 2023-03-09 DIAGNOSIS — Z71.89 COMPLEX CARE COORDINATION: ICD-10-CM

## 2023-03-14 ENCOUNTER — OFFICE VISIT (OUTPATIENT)
Dept: FAMILY MEDICINE | Facility: CLINIC | Age: 68
End: 2023-03-14
Payer: COMMERCIAL

## 2023-03-14 VITALS
TEMPERATURE: 97 F | DIASTOLIC BLOOD PRESSURE: 84 MMHG | OXYGEN SATURATION: 100 % | RESPIRATION RATE: 16 BRPM | BODY MASS INDEX: 32.73 KG/M2 | SYSTOLIC BLOOD PRESSURE: 166 MMHG | HEART RATE: 78 BPM | HEIGHT: 69 IN | WEIGHT: 221 LBS

## 2023-03-14 DIAGNOSIS — I10 HYPERTENSION, UNSPECIFIED TYPE: Primary | ICD-10-CM

## 2023-03-14 DIAGNOSIS — H65.192 OTHER NON-RECURRENT ACUTE NONSUPPURATIVE OTITIS MEDIA OF LEFT EAR: ICD-10-CM

## 2023-03-14 DIAGNOSIS — E78.5 HYPERLIPIDEMIA, UNSPECIFIED HYPERLIPIDEMIA TYPE: ICD-10-CM

## 2023-03-14 DIAGNOSIS — Z12.5 SCREENING FOR PROSTATE CANCER: ICD-10-CM

## 2023-03-14 LAB
ALBUMIN SERPL BCP-MCNC: 4 G/DL (ref 3.5–5)
ALBUMIN/GLOB SERPL: 1.1 {RATIO}
ALP SERPL-CCNC: 58 U/L (ref 45–115)
ALT SERPL W P-5'-P-CCNC: 41 U/L (ref 16–61)
ANION GAP SERPL CALCULATED.3IONS-SCNC: 7 MMOL/L (ref 7–16)
AST SERPL W P-5'-P-CCNC: 31 U/L (ref 15–37)
BASOPHILS # BLD AUTO: 0.05 K/UL (ref 0–0.2)
BASOPHILS NFR BLD AUTO: 1.1 % (ref 0–1)
BILIRUB SERPL-MCNC: 0.7 MG/DL (ref ?–1.2)
BUN SERPL-MCNC: 17 MG/DL (ref 7–18)
BUN/CREAT SERPL: 16 (ref 6–20)
CALCIUM SERPL-MCNC: 9.4 MG/DL (ref 8.5–10.1)
CHLORIDE SERPL-SCNC: 105 MMOL/L (ref 98–107)
CHOLEST SERPL-MCNC: 207 MG/DL (ref 0–200)
CHOLEST/HDLC SERPL: 3.7 {RATIO}
CO2 SERPL-SCNC: 28 MMOL/L (ref 21–32)
CREAT SERPL-MCNC: 1.07 MG/DL (ref 0.7–1.3)
DIFFERENTIAL METHOD BLD: ABNORMAL
EGFR (NO RACE VARIABLE) (RUSH/TITUS): 77 ML/MIN/1.73M²
EOSINOPHIL # BLD AUTO: 0.09 K/UL (ref 0–0.5)
EOSINOPHIL NFR BLD AUTO: 1.9 % (ref 1–4)
ERYTHROCYTE [DISTWIDTH] IN BLOOD BY AUTOMATED COUNT: 14.9 % (ref 11.5–14.5)
GLOBULIN SER-MCNC: 3.8 G/DL (ref 2–4)
GLUCOSE SERPL-MCNC: 135 MG/DL (ref 74–106)
HCT VFR BLD AUTO: 42.1 % (ref 40–54)
HDLC SERPL-MCNC: 56 MG/DL (ref 40–60)
HGB BLD-MCNC: 13.6 G/DL (ref 13.5–18)
IMM GRANULOCYTES # BLD AUTO: 0.01 K/UL (ref 0–0.04)
IMM GRANULOCYTES NFR BLD: 0.2 % (ref 0–0.4)
LDLC SERPL CALC-MCNC: 115 MG/DL
LDLC/HDLC SERPL: 2.1 {RATIO}
LYMPHOCYTES # BLD AUTO: 1.73 K/UL (ref 1–4.8)
LYMPHOCYTES NFR BLD AUTO: 36.8 % (ref 27–41)
MCH RBC QN AUTO: 28.6 PG (ref 27–31)
MCHC RBC AUTO-ENTMCNC: 32.3 G/DL (ref 32–36)
MCV RBC AUTO: 88.6 FL (ref 80–96)
MONOCYTES # BLD AUTO: 0.46 K/UL (ref 0–0.8)
MONOCYTES NFR BLD AUTO: 9.8 % (ref 2–6)
MPC BLD CALC-MCNC: 10.6 FL (ref 9.4–12.4)
NEUTROPHILS # BLD AUTO: 2.36 K/UL (ref 1.8–7.7)
NEUTROPHILS NFR BLD AUTO: 50.2 % (ref 53–65)
NONHDLC SERPL-MCNC: 151 MG/DL
NRBC # BLD AUTO: 0 X10E3/UL
NRBC, AUTO (.00): 0 %
PLATELET # BLD AUTO: 215 K/UL (ref 150–400)
POTASSIUM SERPL-SCNC: 4 MMOL/L (ref 3.5–5.1)
PROT SERPL-MCNC: 7.8 G/DL (ref 6.4–8.2)
PSA SERPL-MCNC: 0.32 NG/ML
RBC # BLD AUTO: 4.75 M/UL (ref 4.6–6.2)
SODIUM SERPL-SCNC: 136 MMOL/L (ref 136–145)
TRIGL SERPL-MCNC: 178 MG/DL (ref 35–150)
VLDLC SERPL-MCNC: 36 MG/DL
WBC # BLD AUTO: 4.7 K/UL (ref 4.5–11)

## 2023-03-14 PROCEDURE — 80053 COMPREHENSIVE METABOLIC PANEL: ICD-10-PCS | Mod: ,,, | Performed by: CLINICAL MEDICAL LABORATORY

## 2023-03-14 PROCEDURE — G0103 PSA, SCREENING: ICD-10-PCS | Mod: ,,, | Performed by: CLINICAL MEDICAL LABORATORY

## 2023-03-14 PROCEDURE — 80053 COMPREHEN METABOLIC PANEL: CPT | Mod: ,,, | Performed by: CLINICAL MEDICAL LABORATORY

## 2023-03-14 PROCEDURE — 1159F PR MEDICATION LIST DOCUMENTED IN MEDICAL RECORD: ICD-10-PCS | Mod: ,,, | Performed by: NURSE PRACTITIONER

## 2023-03-14 PROCEDURE — 80061 LIPID PANEL: ICD-10-PCS | Mod: ,,, | Performed by: CLINICAL MEDICAL LABORATORY

## 2023-03-14 PROCEDURE — 3077F SYST BP >= 140 MM HG: CPT | Mod: ,,, | Performed by: NURSE PRACTITIONER

## 2023-03-14 PROCEDURE — 3288F FALL RISK ASSESSMENT DOCD: CPT | Mod: ,,, | Performed by: NURSE PRACTITIONER

## 2023-03-14 PROCEDURE — 1101F PT FALLS ASSESS-DOCD LE1/YR: CPT | Mod: ,,, | Performed by: NURSE PRACTITIONER

## 2023-03-14 PROCEDURE — 3077F PR MOST RECENT SYSTOLIC BLOOD PRESSURE >= 140 MM HG: ICD-10-PCS | Mod: ,,, | Performed by: NURSE PRACTITIONER

## 2023-03-14 PROCEDURE — G0103 PSA SCREENING: HCPCS | Mod: ,,, | Performed by: CLINICAL MEDICAL LABORATORY

## 2023-03-14 PROCEDURE — 3079F PR MOST RECENT DIASTOLIC BLOOD PRESSURE 80-89 MM HG: ICD-10-PCS | Mod: ,,, | Performed by: NURSE PRACTITIONER

## 2023-03-14 PROCEDURE — 85025 CBC WITH DIFFERENTIAL: ICD-10-PCS | Mod: ,,, | Performed by: CLINICAL MEDICAL LABORATORY

## 2023-03-14 PROCEDURE — 3288F PR FALLS RISK ASSESSMENT DOCUMENTED: ICD-10-PCS | Mod: ,,, | Performed by: NURSE PRACTITIONER

## 2023-03-14 PROCEDURE — 99213 PR OFFICE/OUTPT VISIT, EST, LEVL III, 20-29 MIN: ICD-10-PCS | Mod: ,,, | Performed by: NURSE PRACTITIONER

## 2023-03-14 PROCEDURE — 3008F PR BODY MASS INDEX (BMI) DOCUMENTED: ICD-10-PCS | Mod: ,,, | Performed by: NURSE PRACTITIONER

## 2023-03-14 PROCEDURE — 3079F DIAST BP 80-89 MM HG: CPT | Mod: ,,, | Performed by: NURSE PRACTITIONER

## 2023-03-14 PROCEDURE — 85025 COMPLETE CBC W/AUTO DIFF WBC: CPT | Mod: ,,, | Performed by: CLINICAL MEDICAL LABORATORY

## 2023-03-14 PROCEDURE — 1160F RVW MEDS BY RX/DR IN RCRD: CPT | Mod: ,,, | Performed by: NURSE PRACTITIONER

## 2023-03-14 PROCEDURE — 1160F PR REVIEW ALL MEDS BY PRESCRIBER/CLIN PHARMACIST DOCUMENTED: ICD-10-PCS | Mod: ,,, | Performed by: NURSE PRACTITIONER

## 2023-03-14 PROCEDURE — 99213 OFFICE O/P EST LOW 20 MIN: CPT | Mod: ,,, | Performed by: NURSE PRACTITIONER

## 2023-03-14 PROCEDURE — 3008F BODY MASS INDEX DOCD: CPT | Mod: ,,, | Performed by: NURSE PRACTITIONER

## 2023-03-14 PROCEDURE — 80061 LIPID PANEL: CPT | Mod: ,,, | Performed by: CLINICAL MEDICAL LABORATORY

## 2023-03-14 PROCEDURE — 1159F MED LIST DOCD IN RCRD: CPT | Mod: ,,, | Performed by: NURSE PRACTITIONER

## 2023-03-14 PROCEDURE — 1101F PR PT FALLS ASSESS DOC 0-1 FALLS W/OUT INJ PAST YR: ICD-10-PCS | Mod: ,,, | Performed by: NURSE PRACTITIONER

## 2023-03-14 RX ORDER — OFLOXACIN 3 MG/ML
5 SOLUTION AURICULAR (OTIC) DAILY
Qty: 5 ML | Refills: 0 | Status: SHIPPED | OUTPATIENT
Start: 2023-03-14 | End: 2023-03-19

## 2023-03-14 RX ORDER — AZITHROMYCIN 250 MG/1
TABLET, FILM COATED ORAL
Qty: 6 TABLET | Refills: 0 | Status: SHIPPED | OUTPATIENT
Start: 2023-03-14 | End: 2023-03-19

## 2023-03-14 RX ORDER — CETIRIZINE HYDROCHLORIDE 10 MG/1
10 TABLET ORAL DAILY
Qty: 30 TABLET | Refills: 11 | Status: SHIPPED | OUTPATIENT
Start: 2023-03-14 | End: 2023-09-21 | Stop reason: ALTCHOICE

## 2023-03-14 RX ORDER — METOPROLOL SUCCINATE 25 MG/1
25 TABLET, EXTENDED RELEASE ORAL DAILY
Qty: 90 TABLET | Refills: 1 | Status: SHIPPED | OUTPATIENT
Start: 2023-03-14 | End: 2023-07-06 | Stop reason: SDUPTHER

## 2023-03-14 NOTE — PROGRESS NOTES
BRUNO Cohn   [facility] [address] [place] [address]  [phone]      PATIENT NAME: Joey Hughes  : 1955  DATE: 3/14/23  MRN: [id number]      Billing Provider: BRUNO Cohn  Level of Service:   Patient PCP Information       Provider PCP Type    BRUNO Cohn General            Reason for Visit / Chief Complaint: Hypertension, Hyperlipidemia, and inner ear       Update PCP  Update Chief Complaint         History of Present Illness / Problem Focused Workflow     Joey Hughes presents to the clinic with Hypertension, Hyperlipidemia, and inner ear     Pt presents for routine follow up with lab and med refills. Overall doing well.      BP appears not at goal today. Home meds reviewed  Advised to monitor BP at home. Advised on optimal BP readings - SBP < 130 & DBP < 80. Advised to call office for any persistent BP elevation and may have to prescribe or adjust BP med(s).  Recommended DASH diet, stay well hydrated with water daily, eliminate or decrease caffeinated and high calorie drinks, increase physical activity, and lose weight if BMI > 25.0.      Pt presents with sinus congestion, pressure and cough. No fever, chills or bodyaches. No loss of taste or smell.     Recommend change toothbrush and bed linens  Avoid sharing food or drinks  Encourage oral hydration.         Review of Systems     Review of Systems   Constitutional:  Negative for fatigue and fever.   HENT:  Positive for nasal congestion and ear pain. Negative for sore throat.    Eyes:  Negative for visual disturbance.   Respiratory:  Negative for chest tightness and shortness of breath.    Cardiovascular:  Negative for chest pain and leg swelling.   Gastrointestinal:  Negative for abdominal pain, change in bowel habit and change in bowel habit.   Endocrine: Negative for polydipsia, polyphagia and polyuria.    Genitourinary:  Negative for dysuria and hematuria.   Musculoskeletal:  Negative for back pain and leg pain.   Integumentary:  Negative for rash.   Neurological:  Negative for dizziness, syncope, weakness and light-headedness.      Medical / Social / Family History     Past Medical History:   Diagnosis Date    High cholesterol     Hypertension        History reviewed. No pertinent surgical history.    Social History  Mr. Hughes  reports that he has never smoked. He has never used smokeless tobacco. He reports current alcohol use of about 6.0 standard drinks per week. He reports that he does not use drugs.    Family History  Mr. Hughes's family history is not on file.    Medications and Allergies     Medications  Outpatient Medications Marked as Taking for the 3/14/23 encounter (Office Visit) with BRUNO Cohn   Medication Sig Dispense Refill    amLODIPine (NORVASC) 10 MG tablet Take 1 tablet (10 mg total) by mouth once daily. 90 tablet 1    losartan-hydrochlorothiazide 100-25 mg (HYZAAR) 100-25 mg per tablet Take 1 tablet by mouth once daily. 90 tablet 1    rosuvastatin (CRESTOR) 5 MG tablet Take 1 tablet (5 mg total) by mouth once daily. 90 tablet 1       Allergies  Review of patient's allergies indicates:  No Known Allergies    Physical Examination     Vitals:    03/14/23 1313   BP: (!) 166/84   Pulse: 78   Resp: 16   Temp: 97.2 °F (36.2 °C)     Physical Exam  HENT:      Ears:      Comments: Left TM red no drainage no bleeding   Eyes:      Pupils: Pupils are equal, round, and reactive to light.   Cardiovascular:      Rate and Rhythm: Normal rate and regular rhythm.      Heart sounds: Normal heart sounds. No murmur heard.  Pulmonary:      Breath sounds: Normal breath sounds. No wheezing, rhonchi or rales.   Abdominal:      General: Bowel sounds are normal.   Musculoskeletal:         General: No swelling.      Cervical back: Normal range of motion and neck supple.   Skin:     General: Skin is warm  and dry.   Neurological:      Mental Status: He is alert and oriented to person, place, and time.        Assessment and Plan (including Health Maintenance)      Problem List  Smart Sets  Document Outside HM   :    Plan:   1. Hypertension, unspecified type  -     CBC Auto Differential; Future; Expected date: 03/14/2023  -     Comprehensive Metabolic Panel; Future; Expected date: 03/14/2023  -     metoprolol succinate (TOPROL-XL) 25 MG 24 hr tablet; Take 1 tablet (25 mg total) by mouth once daily.  Dispense: 90 tablet; Refill: 1    2. Hyperlipidemia, unspecified hyperlipidemia type  -     Lipid Panel; Future; Expected date: 03/14/2023    3. Screening for prostate cancer  -     PSA, Screening; Future; Expected date: 03/14/2023    4. Other non-recurrent acute nonsuppurative otitis media of left ear  -     cetirizine (ZYRTEC) 10 MG tablet; Take 1 tablet (10 mg total) by mouth once daily.  Dispense: 30 tablet; Refill: 11  -     ofloxacin (FLOXIN) 0.3 % otic solution; Place 5 drops into both ears once daily. for 5 days  Dispense: 5 mL; Refill: 0  -     azithromycin (Z-VALENTINO) 250 MG tablet; Take 2 tablets by mouth on day 1; Take 1 tablet by mouth on days 2-5  Dispense: 6 tablet; Refill: 0           Health Maintenance Due   Topic Date Due    PROSTATE-SPECIFIC ANTIGEN  02/16/2023       Problem List Items Addressed This Visit          Cardiac/Vascular    Hypertension - Primary    Relevant Medications    metoprolol succinate (TOPROL-XL) 25 MG 24 hr tablet    Other Relevant Orders    CBC Auto Differential    Comprehensive Metabolic Panel    Hyperlipidemia    Relevant Orders    Lipid Panel     Other Visit Diagnoses       Screening for prostate cancer        Relevant Orders    PSA, Screening    Other non-recurrent acute nonsuppurative otitis media of left ear        Relevant Medications    cetirizine (ZYRTEC) 10 MG tablet    ofloxacin (FLOXIN) 0.3 % otic solution    azithromycin (Z-VALENTINO) 250 MG tablet            Health Maintenance  Topics with due status: Not Due       Topic Last Completion Date    Diabetes Urine Screening 06/09/2022    Hemoglobin A1c 06/09/2022    Lipid Panel 12/12/2022    Colorectal Cancer Screening 02/22/2023       Future Appointments   Date Time Provider Department Center   6/20/2023  1:20 PM BRUNO Cohn [facility]   9/21/2023  1:00 PM AWV NURSE, LUZ MARINA [facility] Hwang            Signature:  BRUNO Cohn [facility] [address] [place] [address]  [phone]    Date of encounter: 3/14/23

## 2023-07-06 ENCOUNTER — OFFICE VISIT (OUTPATIENT)
Dept: FAMILY MEDICINE | Facility: CLINIC | Age: 68
End: 2023-07-06
Payer: COMMERCIAL

## 2023-07-06 VITALS
DIASTOLIC BLOOD PRESSURE: 84 MMHG | HEIGHT: 69 IN | OXYGEN SATURATION: 98 % | HEART RATE: 70 BPM | WEIGHT: 224 LBS | BODY MASS INDEX: 33.18 KG/M2 | SYSTOLIC BLOOD PRESSURE: 197 MMHG | TEMPERATURE: 98 F | RESPIRATION RATE: 16 BRPM

## 2023-07-06 DIAGNOSIS — R73.01 IMPAIRED FASTING GLUCOSE: ICD-10-CM

## 2023-07-06 DIAGNOSIS — R73.03 PRE-DIABETES: ICD-10-CM

## 2023-07-06 DIAGNOSIS — I10 HYPERTENSION, UNSPECIFIED TYPE: Primary | ICD-10-CM

## 2023-07-06 DIAGNOSIS — E78.5 HYPERLIPIDEMIA, UNSPECIFIED HYPERLIPIDEMIA TYPE: ICD-10-CM

## 2023-07-06 LAB
ALBUMIN SERPL BCP-MCNC: 3.9 G/DL (ref 3.5–5)
ALBUMIN/GLOB SERPL: 1.1 {RATIO}
ALP SERPL-CCNC: 50 U/L (ref 45–115)
ALT SERPL W P-5'-P-CCNC: 40 U/L (ref 16–61)
ANION GAP SERPL CALCULATED.3IONS-SCNC: 9 MMOL/L (ref 7–16)
AST SERPL W P-5'-P-CCNC: 29 U/L (ref 15–37)
BILIRUB SERPL-MCNC: 0.5 MG/DL (ref ?–1.2)
BUN SERPL-MCNC: 15 MG/DL (ref 7–18)
BUN/CREAT SERPL: 12 (ref 6–20)
CALCIUM SERPL-MCNC: 9.2 MG/DL (ref 8.5–10.1)
CHLORIDE SERPL-SCNC: 106 MMOL/L (ref 98–107)
CHOLEST SERPL-MCNC: 205 MG/DL (ref 0–200)
CHOLEST/HDLC SERPL: 3.9 {RATIO}
CO2 SERPL-SCNC: 28 MMOL/L (ref 21–32)
CREAT SERPL-MCNC: 1.23 MG/DL (ref 0.7–1.3)
EGFR (NO RACE VARIABLE) (RUSH/TITUS): 65 ML/MIN/1.73M2
EST. AVERAGE GLUCOSE BLD GHB EST-MCNC: 114 MG/DL
GLOBULIN SER-MCNC: 3.4 G/DL (ref 2–4)
GLUCOSE SERPL-MCNC: 127 MG/DL (ref 74–106)
HBA1C MFR BLD HPLC: 6 % (ref 4.5–6.6)
HDLC SERPL-MCNC: 52 MG/DL (ref 40–60)
LDLC SERPL CALC-MCNC: 106 MG/DL
LDLC/HDLC SERPL: 2 {RATIO}
NONHDLC SERPL-MCNC: 153 MG/DL
POTASSIUM SERPL-SCNC: 4 MMOL/L (ref 3.5–5.1)
PROT SERPL-MCNC: 7.3 G/DL (ref 6.4–8.2)
SODIUM SERPL-SCNC: 139 MMOL/L (ref 136–145)
TRIGL SERPL-MCNC: 237 MG/DL (ref 35–150)
VLDLC SERPL-MCNC: 47 MG/DL

## 2023-07-06 PROCEDURE — 80053 COMPREHEN METABOLIC PANEL: CPT | Mod: ,,, | Performed by: CLINICAL MEDICAL LABORATORY

## 2023-07-06 PROCEDURE — 3079F DIAST BP 80-89 MM HG: CPT | Mod: ,,, | Performed by: NURSE PRACTITIONER

## 2023-07-06 PROCEDURE — 1160F RVW MEDS BY RX/DR IN RCRD: CPT | Mod: ,,, | Performed by: NURSE PRACTITIONER

## 2023-07-06 PROCEDURE — 3066F PR DOCUMENTATION OF TREATMENT FOR NEPHROPATHY: ICD-10-PCS | Mod: ,,, | Performed by: NURSE PRACTITIONER

## 2023-07-06 PROCEDURE — 99213 OFFICE O/P EST LOW 20 MIN: CPT | Mod: ,,, | Performed by: NURSE PRACTITIONER

## 2023-07-06 PROCEDURE — 80061 LIPID PANEL: CPT | Mod: ,,, | Performed by: CLINICAL MEDICAL LABORATORY

## 2023-07-06 PROCEDURE — 1159F PR MEDICATION LIST DOCUMENTED IN MEDICAL RECORD: ICD-10-PCS | Mod: ,,, | Performed by: NURSE PRACTITIONER

## 2023-07-06 PROCEDURE — 3008F BODY MASS INDEX DOCD: CPT | Mod: ,,, | Performed by: NURSE PRACTITIONER

## 2023-07-06 PROCEDURE — 3044F PR MOST RECENT HEMOGLOBIN A1C LEVEL <7.0%: ICD-10-PCS | Mod: ,,, | Performed by: NURSE PRACTITIONER

## 2023-07-06 PROCEDURE — 3077F SYST BP >= 140 MM HG: CPT | Mod: ,,, | Performed by: NURSE PRACTITIONER

## 2023-07-06 PROCEDURE — 83036 HEMOGLOBIN GLYCOSYLATED A1C: CPT | Mod: ,,, | Performed by: CLINICAL MEDICAL LABORATORY

## 2023-07-06 PROCEDURE — 3079F PR MOST RECENT DIASTOLIC BLOOD PRESSURE 80-89 MM HG: ICD-10-PCS | Mod: ,,, | Performed by: NURSE PRACTITIONER

## 2023-07-06 PROCEDURE — 3008F PR BODY MASS INDEX (BMI) DOCUMENTED: ICD-10-PCS | Mod: ,,, | Performed by: NURSE PRACTITIONER

## 2023-07-06 PROCEDURE — 82570 MICROALBUMIN / CREATININE RATIO URINE: ICD-10-PCS | Mod: ,,, | Performed by: CLINICAL MEDICAL LABORATORY

## 2023-07-06 PROCEDURE — 3066F NEPHROPATHY DOC TX: CPT | Mod: ,,, | Performed by: NURSE PRACTITIONER

## 2023-07-06 PROCEDURE — 3044F HG A1C LEVEL LT 7.0%: CPT | Mod: ,,, | Performed by: NURSE PRACTITIONER

## 2023-07-06 PROCEDURE — 82043 UR ALBUMIN QUANTITATIVE: CPT | Mod: ,,, | Performed by: CLINICAL MEDICAL LABORATORY

## 2023-07-06 PROCEDURE — 83036 HEMOGLOBIN A1C: ICD-10-PCS | Mod: ,,, | Performed by: CLINICAL MEDICAL LABORATORY

## 2023-07-06 PROCEDURE — 3077F PR MOST RECENT SYSTOLIC BLOOD PRESSURE >= 140 MM HG: ICD-10-PCS | Mod: ,,, | Performed by: NURSE PRACTITIONER

## 2023-07-06 PROCEDURE — 80061 LIPID PANEL: ICD-10-PCS | Mod: ,,, | Performed by: CLINICAL MEDICAL LABORATORY

## 2023-07-06 PROCEDURE — 99213 PR OFFICE/OUTPT VISIT, EST, LEVL III, 20-29 MIN: ICD-10-PCS | Mod: ,,, | Performed by: NURSE PRACTITIONER

## 2023-07-06 PROCEDURE — 1159F MED LIST DOCD IN RCRD: CPT | Mod: ,,, | Performed by: NURSE PRACTITIONER

## 2023-07-06 PROCEDURE — 1160F PR REVIEW ALL MEDS BY PRESCRIBER/CLIN PHARMACIST DOCUMENTED: ICD-10-PCS | Mod: ,,, | Performed by: NURSE PRACTITIONER

## 2023-07-06 PROCEDURE — 82570 ASSAY OF URINE CREATININE: CPT | Mod: ,,, | Performed by: CLINICAL MEDICAL LABORATORY

## 2023-07-06 PROCEDURE — 82043 MICROALBUMIN / CREATININE RATIO URINE: ICD-10-PCS | Mod: ,,, | Performed by: CLINICAL MEDICAL LABORATORY

## 2023-07-06 PROCEDURE — 80053 COMPREHENSIVE METABOLIC PANEL: ICD-10-PCS | Mod: ,,, | Performed by: CLINICAL MEDICAL LABORATORY

## 2023-07-06 PROCEDURE — 3061F NEG MICROALBUMINURIA REV: CPT | Mod: ,,, | Performed by: NURSE PRACTITIONER

## 2023-07-06 PROCEDURE — 3061F PR NEG MICROALBUMINURIA RESULT DOCUMENTED/REVIEW: ICD-10-PCS | Mod: ,,, | Performed by: NURSE PRACTITIONER

## 2023-07-06 RX ORDER — LOSARTAN POTASSIUM AND HYDROCHLOROTHIAZIDE 25; 100 MG/1; MG/1
1 TABLET ORAL DAILY
Qty: 90 TABLET | Refills: 1 | Status: SHIPPED | OUTPATIENT
Start: 2023-07-06 | End: 2023-11-27 | Stop reason: SDUPTHER

## 2023-07-06 RX ORDER — ROSUVASTATIN CALCIUM 5 MG/1
5 TABLET, COATED ORAL DAILY
Qty: 90 TABLET | Refills: 1 | Status: SHIPPED | OUTPATIENT
Start: 2023-07-06 | End: 2023-11-27 | Stop reason: SDUPTHER

## 2023-07-06 RX ORDER — B1/B2/B3/B5/B6/IRON/METH/CHOLN 2.5-18/15
1 LIQUID (ML) ORAL DAILY
COMMUNITY

## 2023-07-06 RX ORDER — METOPROLOL SUCCINATE 25 MG/1
25 TABLET, EXTENDED RELEASE ORAL DAILY
Qty: 90 TABLET | Refills: 1 | Status: SHIPPED | OUTPATIENT
Start: 2023-07-06 | End: 2023-11-27 | Stop reason: SDUPTHER

## 2023-07-06 RX ORDER — AMLODIPINE BESYLATE 10 MG/1
10 TABLET ORAL DAILY
Qty: 90 TABLET | Refills: 1 | Status: SHIPPED | OUTPATIENT
Start: 2023-07-06 | End: 2023-11-27

## 2023-07-07 LAB
CREAT UR-MCNC: 129 MG/DL (ref 39–259)
MICROALBUMIN UR-MCNC: 1.3 MG/DL (ref 0–2.8)
MICROALBUMIN/CREAT RATIO PNL UR: 10.1 MG/G (ref 0–30)

## 2023-07-07 NOTE — ASSESSMENT & PLAN NOTE
BP Readings from Last 3 Encounters:   07/06/23 (!) 197/84   03/14/23 (!) 166/84   02/22/23 (!) 154/83     Goal less 130/80  DASH diet  Take meds 30min prior to next appt

## 2023-07-07 NOTE — ASSESSMENT & PLAN NOTE
Lab Results   Component Value Date    CHOL 205 (H) 07/06/2023    CHOL 207 (H) 03/14/2023    CHOL 229 (H) 12/12/2022     Lab Results   Component Value Date    HDL 52 07/06/2023    HDL 56 03/14/2023    HDL 63 (H) 12/12/2022     Lab Results   Component Value Date    LDLCALC 106 07/06/2023    LDLCALC 115 03/14/2023    LDLCALC 137 12/12/2022     No results found for: DLDL  Lab Results   Component Value Date    TRIG 237 (H) 07/06/2023    TRIG 178 (H) 03/14/2023    TRIG 145 12/12/2022       f1   Lab Results   Component Value Date    CHOLHDL 3.9 07/06/2023    CHOLHDL 3.7 03/14/2023    CHOLHDL 3.6 12/12/2022     Cont home meds  Monitor diet

## 2023-07-07 NOTE — PROGRESS NOTES
BRUNO Cohn   [facility] [address] [place] [address]  [phone]      PATIENT NAME: Joey Hughes  : 1955  DATE: 23  MRN: [id number]      Billing Provider: BRUNO Cohn  Level of Service:   Patient PCP Information       Provider PCP Type    BRUNO Cohn General            Reason for Visit / Chief Complaint: Follow-up, Hyperlipidemia, and pre-DM       Update PCP  Update Chief Complaint         History of Present Illness / Problem Focused Workflow     Joey Hughes presents to the clinic with Follow-up, Hyperlipidemia, and pre-DM     Pt presents for routine follow up with lab and med refills. Overall doing well.      BP appears poorly controlled today. Home meds reviewed, no meds this am.   Advised to monitor BP at home. Advised on optimal BP readings - SBP < 130 & DBP < 80. Advised to call office for any persistent BP elevation and may have to prescribe or adjust BP med(s).Recommended DASH diet, stay well hydrated with water daily, eliminate or decrease caffeinated and high calorie drinks, increase physical activity, and lose weight if BMI > 25.0.            Review of Systems     Review of Systems   Constitutional:  Negative for fatigue and fever.   HENT:  Negative for nasal congestion and sore throat.    Eyes:  Negative for visual disturbance.   Respiratory:  Negative for chest tightness and shortness of breath.    Cardiovascular:  Negative for chest pain and leg swelling.   Gastrointestinal:  Negative for abdominal pain, change in bowel habit and change in bowel habit.   Endocrine: Negative for polydipsia, polyphagia and polyuria.   Genitourinary:  Negative for dysuria and hematuria.   Musculoskeletal:  Negative for back pain and leg pain.   Integumentary:  Negative for rash.   Neurological:  Negative for dizziness, syncope, weakness and light-headedness.      Medical /  Social / Family History     Past Medical History:   Diagnosis Date    High cholesterol     Hypertension        No past surgical history on file.    Social History  Mr. Hughes  reports that he has never smoked. He has never used smokeless tobacco. He reports current alcohol use of about 6.0 standard drinks per week. He reports that he does not use drugs.    Family History  Mr. Hughes's family history is not on file.    Medications and Allergies     Medications  Outpatient Medications Marked as Taking for the 7/6/23 encounter (Office Visit) with BRUNO Cohn   Medication Sig Dispense Refill    cetirizine (ZYRTEC) 10 MG tablet Take 1 tablet (10 mg total) by mouth once daily. (Patient taking differently: Take 10 mg by mouth daily as needed.) 30 tablet 11    [DISCONTINUED] metoprolol succinate (TOPROL-XL) 25 MG 24 hr tablet Take 1 tablet (25 mg total) by mouth once daily. 90 tablet 1       Allergies  Review of patient's allergies indicates:  No Known Allergies    Physical Examination     Vitals:    07/06/23 1059   BP: (!) 197/84   Pulse: 70   Resp: 16   Temp: 98.4 °F (36.9 °C)     Physical Exam  Eyes:      Pupils: Pupils are equal, round, and reactive to light.   Cardiovascular:      Rate and Rhythm: Normal rate and regular rhythm.      Heart sounds: Normal heart sounds. No murmur heard.  Pulmonary:      Breath sounds: Normal breath sounds. No wheezing, rhonchi or rales.   Abdominal:      General: Bowel sounds are normal.   Musculoskeletal:         General: No swelling.      Cervical back: Normal range of motion and neck supple.   Skin:     General: Skin is warm and dry.   Neurological:      Mental Status: He is alert and oriented to person, place, and time.        Lab Results   Component Value Date    WBC 4.70 03/14/2023    HGB 13.6 03/14/2023    HCT 42.1 03/14/2023    MCV 88.6 03/14/2023     03/14/2023        Sodium   Date Value Ref Range Status   07/06/2023 139 136 - 145 mmol/L Final      Potassium   Date Value Ref Range Status   07/06/2023 4.0 3.5 - 5.1 mmol/L Final     Chloride   Date Value Ref Range Status   07/06/2023 106 98 - 107 mmol/L Final     CO2   Date Value Ref Range Status   07/06/2023 28 21 - 32 mmol/L Final     Glucose   Date Value Ref Range Status   07/06/2023 127 (H) 74 - 106 mg/dL Final     BUN   Date Value Ref Range Status   07/06/2023 15 7 - 18 mg/dL Final     Creatinine   Date Value Ref Range Status   07/06/2023 1.23 0.70 - 1.30 mg/dL Final     Calcium   Date Value Ref Range Status   07/06/2023 9.2 8.5 - 10.1 mg/dL Final     Total Protein   Date Value Ref Range Status   07/06/2023 7.3 6.4 - 8.2 g/dL Final     Albumin   Date Value Ref Range Status   07/06/2023 3.9 3.5 - 5.0 g/dL Final     Bilirubin, Total   Date Value Ref Range Status   07/06/2023 0.5 >0.0 - 1.2 mg/dL Final     Alk Phos   Date Value Ref Range Status   07/06/2023 50 45 - 115 U/L Final     AST   Date Value Ref Range Status   07/06/2023 29 15 - 37 U/L Final     ALT   Date Value Ref Range Status   07/06/2023 40 16 - 61 U/L Final     Anion Gap   Date Value Ref Range Status   07/06/2023 9 7 - 16 mmol/L Final     eGFR   Date Value Ref Range Status   07/06/2023 65 >=60 mL/min/1.73m2 Final      Lab Results   Component Value Date    HGBA1C 6.0 07/06/2023      Lab Results   Component Value Date    CHOL 205 (H) 07/06/2023    CHOL 207 (H) 03/14/2023    CHOL 229 (H) 12/12/2022     Lab Results   Component Value Date    HDL 52 07/06/2023    HDL 56 03/14/2023    HDL 63 (H) 12/12/2022     Lab Results   Component Value Date    LDLCALC 106 07/06/2023    LDLCALC 115 03/14/2023    LDLCALC 137 12/12/2022     No results found for: DLDL  Lab Results   Component Value Date    TRIG 237 (H) 07/06/2023    TRIG 178 (H) 03/14/2023    TRIG 145 12/12/2022     Lab Results   Component Value Date    CHOLHDL 3.9 07/06/2023    CHOLHDL 3.7 03/14/2023    CHOLHDL 3.6 12/12/2022      Lab Results   Component Value Date    TSH 1.100 02/16/2022         Assessment and Plan (including Health Maintenance)      Problem List  Smart Sets  Document Outside HM   :    Plan:     1. Hypertension, unspecified type  Assessment & Plan:  BP Readings from Last 3 Encounters:   07/06/23 (!) 197/84   03/14/23 (!) 166/84   02/22/23 (!) 154/83     Goal less 130/80  DASH diet  Take meds 30min prior to next appt    Orders:  -     Comprehensive Metabolic Panel; Future; Expected date: 07/06/2023  -     Microalbumin/Creatinine Ratio, Urine; Future; Expected date: 07/06/2023  -     amLODIPine (NORVASC) 10 MG tablet; Take 1 tablet (10 mg total) by mouth once daily.  Dispense: 90 tablet; Refill: 1  -     losartan-hydrochlorothiazide 100-25 mg (HYZAAR) 100-25 mg per tablet; Take 1 tablet by mouth once daily.  Dispense: 90 tablet; Refill: 1  -     metoprolol succinate (TOPROL-XL) 25 MG 24 hr tablet; Take 1 tablet (25 mg total) by mouth once daily.  Dispense: 90 tablet; Refill: 1    2. Hyperlipidemia, unspecified hyperlipidemia type  Assessment & Plan:  Lab Results   Component Value Date    CHOL 205 (H) 07/06/2023    CHOL 207 (H) 03/14/2023    CHOL 229 (H) 12/12/2022     Lab Results   Component Value Date    HDL 52 07/06/2023    HDL 56 03/14/2023    HDL 63 (H) 12/12/2022     Lab Results   Component Value Date    LDLCALC 106 07/06/2023    LDLCALC 115 03/14/2023    LDLCALC 137 12/12/2022     No results found for: DLDL  Lab Results   Component Value Date    TRIG 237 (H) 07/06/2023    TRIG 178 (H) 03/14/2023    TRIG 145 12/12/2022       f1   Lab Results   Component Value Date    CHOLHDL 3.9 07/06/2023    CHOLHDL 3.7 03/14/2023    CHOLHDL 3.6 12/12/2022     Cont home meds  Monitor diet     Orders:  -     Lipid Panel; Future; Expected date: 07/06/2023  -     rosuvastatin (CRESTOR) 5 MG tablet; Take 1 tablet (5 mg total) by mouth once daily.  Dispense: 90 tablet; Refill: 1    3. Impaired fasting glucose    4. Pre-diabetes  -     Hemoglobin A1C; Future; Expected date: 07/06/2023         Patient  Instructions   DASH diet  Bp control      There are no preventive care reminders to display for this patient.      Health Maintenance Topics with due status: Not Due       Topic Last Completion Date    Colorectal Cancer Screening 02/22/2023    PROSTATE-SPECIFIC ANTIGEN 03/14/2023    Diabetes Urine Screening 07/06/2023    Lipid Panel 07/06/2023    Hemoglobin A1c 07/06/2023    Influenza Vaccine Not Due       Future Appointments   Date Time Provider Department Center   7/19/2023 10:20 AM BRUNO Cohn [facility]   9/21/2023  1:00 PM AWV NURSE, LUZ MARINA [facility] Hwang            Signature:  BRUNO Cohn  [facility] [address] [place] [address]  [phone]    Date of encounter: 7/6/23

## 2023-07-19 ENCOUNTER — OFFICE VISIT (OUTPATIENT)
Dept: FAMILY MEDICINE | Facility: CLINIC | Age: 68
End: 2023-07-19
Payer: COMMERCIAL

## 2023-07-19 VITALS
HEIGHT: 69 IN | WEIGHT: 222 LBS | OXYGEN SATURATION: 98 % | DIASTOLIC BLOOD PRESSURE: 92 MMHG | RESPIRATION RATE: 16 BRPM | SYSTOLIC BLOOD PRESSURE: 180 MMHG | BODY MASS INDEX: 32.88 KG/M2 | HEART RATE: 73 BPM | TEMPERATURE: 98 F

## 2023-07-19 DIAGNOSIS — I10 HYPERTENSION, UNSPECIFIED TYPE: Primary | ICD-10-CM

## 2023-07-19 PROCEDURE — 3008F BODY MASS INDEX DOCD: CPT | Mod: ,,, | Performed by: NURSE PRACTITIONER

## 2023-07-19 PROCEDURE — 1160F PR REVIEW ALL MEDS BY PRESCRIBER/CLIN PHARMACIST DOCUMENTED: ICD-10-PCS | Mod: ,,, | Performed by: NURSE PRACTITIONER

## 2023-07-19 PROCEDURE — 3061F PR NEG MICROALBUMINURIA RESULT DOCUMENTED/REVIEW: ICD-10-PCS | Mod: ,,, | Performed by: NURSE PRACTITIONER

## 2023-07-19 PROCEDURE — 3066F NEPHROPATHY DOC TX: CPT | Mod: ,,, | Performed by: NURSE PRACTITIONER

## 2023-07-19 PROCEDURE — 99212 OFFICE O/P EST SF 10 MIN: CPT | Mod: ,,, | Performed by: NURSE PRACTITIONER

## 2023-07-19 PROCEDURE — 3061F NEG MICROALBUMINURIA REV: CPT | Mod: ,,, | Performed by: NURSE PRACTITIONER

## 2023-07-19 PROCEDURE — 3066F PR DOCUMENTATION OF TREATMENT FOR NEPHROPATHY: ICD-10-PCS | Mod: ,,, | Performed by: NURSE PRACTITIONER

## 2023-07-19 PROCEDURE — 1159F PR MEDICATION LIST DOCUMENTED IN MEDICAL RECORD: ICD-10-PCS | Mod: ,,, | Performed by: NURSE PRACTITIONER

## 2023-07-19 PROCEDURE — 1160F RVW MEDS BY RX/DR IN RCRD: CPT | Mod: ,,, | Performed by: NURSE PRACTITIONER

## 2023-07-19 PROCEDURE — 3044F HG A1C LEVEL LT 7.0%: CPT | Mod: ,,, | Performed by: NURSE PRACTITIONER

## 2023-07-19 PROCEDURE — 3077F PR MOST RECENT SYSTOLIC BLOOD PRESSURE >= 140 MM HG: ICD-10-PCS | Mod: ,,, | Performed by: NURSE PRACTITIONER

## 2023-07-19 PROCEDURE — 3080F PR MOST RECENT DIASTOLIC BLOOD PRESSURE >= 90 MM HG: ICD-10-PCS | Mod: ,,, | Performed by: NURSE PRACTITIONER

## 2023-07-19 PROCEDURE — 3080F DIAST BP >= 90 MM HG: CPT | Mod: ,,, | Performed by: NURSE PRACTITIONER

## 2023-07-19 PROCEDURE — 3008F PR BODY MASS INDEX (BMI) DOCUMENTED: ICD-10-PCS | Mod: ,,, | Performed by: NURSE PRACTITIONER

## 2023-07-19 PROCEDURE — 1159F MED LIST DOCD IN RCRD: CPT | Mod: ,,, | Performed by: NURSE PRACTITIONER

## 2023-07-19 PROCEDURE — 99212 PR OFFICE/OUTPT VISIT, EST, LEVL II, 10-19 MIN: ICD-10-PCS | Mod: ,,, | Performed by: NURSE PRACTITIONER

## 2023-07-19 PROCEDURE — 3077F SYST BP >= 140 MM HG: CPT | Mod: ,,, | Performed by: NURSE PRACTITIONER

## 2023-07-19 PROCEDURE — 3044F PR MOST RECENT HEMOGLOBIN A1C LEVEL <7.0%: ICD-10-PCS | Mod: ,,, | Performed by: NURSE PRACTITIONER

## 2023-07-19 RX ORDER — HYDRALAZINE HYDROCHLORIDE 100 MG/1
100 TABLET, FILM COATED ORAL 2 TIMES DAILY
Qty: 180 TABLET | Refills: 1 | Status: SHIPPED | OUTPATIENT
Start: 2023-07-19 | End: 2023-07-19 | Stop reason: SDUPTHER

## 2023-07-19 NOTE — PROGRESS NOTES
"   BRUNO Cohn   [facility] [address] [place] [address]  [phone]      PATIENT NAME: Joey Hughes  : 1955  DATE: 23  MRN: [id number]      Billing Provider: BRUNO Cohn  Level of Service:   Patient PCP Information       Provider PCP Type    BRUNO Cohn General            Reason for Visit / Chief Complaint: Follow-up and Hypertension       Update PCP  Update Chief Complaint         History of Present Illness / Problem Focused Workflow     Joey Hughes presents to the clinic with Follow-up and Hypertension     Patient presents for 2 week blood pressure follow-up.  Blood pressure is elevated today but improved from 2 weeks ago.  Discussed with patient the importance of taking medications as prescribed.  After having discussion patient reports he has not been taking the medications as prescribed.  He reports he is concerned that the medication may be affecting his "nature".  Discussed with patient erectile dysfunction is often related to uncontrolled hypertension recommended that he take the medication as prescribed to get his blood pressure under control.  If he continues to have issues with sexual dysfunction after blood pressure is controlled we will consider either medication adjustment verses other therapies to assist with his issue.    Patient verbalized understanding and agreement goal for blood pressure is 130/80.      Review of Systems     Review of Systems   Constitutional:  Negative for fatigue and fever.   HENT:  Negative for nasal congestion and sore throat.    Eyes:  Negative for visual disturbance.   Respiratory:  Negative for chest tightness and shortness of breath.    Cardiovascular:  Negative for chest pain and leg swelling.   Gastrointestinal:  Negative for abdominal pain, change in bowel habit and change in bowel habit.   Endocrine: Negative for " polydipsia, polyphagia and polyuria.   Genitourinary:  Positive for erectile dysfunction. Negative for dysuria and hematuria.   Musculoskeletal:  Negative for back pain and leg pain.   Integumentary:  Negative for rash.   Neurological:  Negative for dizziness, syncope, weakness and light-headedness.      Medical / Social / Family History     Past Medical History:   Diagnosis Date    High cholesterol     Hypertension        No past surgical history on file.    Social History  Mr. Hughes  reports that he has never smoked. He has never used smokeless tobacco. He reports current alcohol use of about 6.0 standard drinks per week. He reports that he does not use drugs.    Family History  Mr. Hughes's family history is not on file.    Medications and Allergies     Medications  Outpatient Medications Marked as Taking for the 7/19/23 encounter (Office Visit) with BRUNO Cohn   Medication Sig Dispense Refill    amLODIPine (NORVASC) 10 MG tablet Take 1 tablet (10 mg total) by mouth once daily. 90 tablet 1    cetirizine (ZYRTEC) 10 MG tablet Take 1 tablet (10 mg total) by mouth once daily. (Patient taking differently: Take 10 mg by mouth daily as needed.) 30 tablet 11    losartan-hydrochlorothiazide 100-25 mg (HYZAAR) 100-25 mg per tablet Take 1 tablet by mouth once daily. 90 tablet 1    metoprolol succinate (TOPROL-XL) 25 MG 24 hr tablet Take 1 tablet (25 mg total) by mouth once daily. 90 tablet 1    mv, min #36-iron,carbonyl-FA (GERITOL COMPLETE) 16 mg iron- 0.38 mg Tab Take 1 tablet by mouth once daily.      rosuvastatin (CRESTOR) 5 MG tablet Take 1 tablet (5 mg total) by mouth once daily. 90 tablet 1       Allergies  Review of patient's allergies indicates:  No Known Allergies    Physical Examination     Vitals:    07/19/23 1035   BP: (!) 180/92   Pulse: 73   Resp: 16   Temp: 98.3 °F (36.8 °C)     Physical Exam  Eyes:      Pupils: Pupils are equal, round, and reactive to light.   Cardiovascular:      Rate  and Rhythm: Normal rate and regular rhythm.      Heart sounds: Normal heart sounds. No murmur heard.  Pulmonary:      Breath sounds: Normal breath sounds. No wheezing, rhonchi or rales.   Abdominal:      General: Bowel sounds are normal.   Musculoskeletal:         General: No swelling.      Cervical back: Normal range of motion and neck supple.   Skin:     General: Skin is warm and dry.   Neurological:      Mental Status: He is alert and oriented to person, place, and time.        Lab Results   Component Value Date    WBC 4.70 03/14/2023    HGB 13.6 03/14/2023    HCT 42.1 03/14/2023    MCV 88.6 03/14/2023     03/14/2023        Sodium   Date Value Ref Range Status   07/06/2023 139 136 - 145 mmol/L Final     Potassium   Date Value Ref Range Status   07/06/2023 4.0 3.5 - 5.1 mmol/L Final     Chloride   Date Value Ref Range Status   07/06/2023 106 98 - 107 mmol/L Final     CO2   Date Value Ref Range Status   07/06/2023 28 21 - 32 mmol/L Final     Glucose   Date Value Ref Range Status   07/06/2023 127 (H) 74 - 106 mg/dL Final     BUN   Date Value Ref Range Status   07/06/2023 15 7 - 18 mg/dL Final     Creatinine   Date Value Ref Range Status   07/06/2023 1.23 0.70 - 1.30 mg/dL Final     Calcium   Date Value Ref Range Status   07/06/2023 9.2 8.5 - 10.1 mg/dL Final     Total Protein   Date Value Ref Range Status   07/06/2023 7.3 6.4 - 8.2 g/dL Final     Albumin   Date Value Ref Range Status   07/06/2023 3.9 3.5 - 5.0 g/dL Final     Bilirubin, Total   Date Value Ref Range Status   07/06/2023 0.5 >0.0 - 1.2 mg/dL Final     Alk Phos   Date Value Ref Range Status   07/06/2023 50 45 - 115 U/L Final     AST   Date Value Ref Range Status   07/06/2023 29 15 - 37 U/L Final     ALT   Date Value Ref Range Status   07/06/2023 40 16 - 61 U/L Final     Anion Gap   Date Value Ref Range Status   07/06/2023 9 7 - 16 mmol/L Final     eGFR   Date Value Ref Range Status   07/06/2023 65 >=60 mL/min/1.73m2 Final      Lab Results    Component Value Date    HGBA1C 6.0 07/06/2023      Lab Results   Component Value Date    CHOL 205 (H) 07/06/2023    CHOL 207 (H) 03/14/2023    CHOL 229 (H) 12/12/2022     Lab Results   Component Value Date    HDL 52 07/06/2023    HDL 56 03/14/2023    HDL 63 (H) 12/12/2022     Lab Results   Component Value Date    LDLCALC 106 07/06/2023    LDLCALC 115 03/14/2023    LDLCALC 137 12/12/2022     No results found for: DLDL  Lab Results   Component Value Date    TRIG 237 (H) 07/06/2023    TRIG 178 (H) 03/14/2023    TRIG 145 12/12/2022     Lab Results   Component Value Date    CHOLHDL 3.9 07/06/2023    CHOLHDL 3.7 03/14/2023    CHOLHDL 3.6 12/12/2022      Lab Results   Component Value Date    TSH 1.100 02/16/2022        Assessment and Plan (including Health Maintenance)      Problem List  Smart Sets  Document Outside HM   :    Plan:     1. Hypertension, unspecified type    Other orders  -     Discontinue: hydrALAZINE (APRESOLINE) 100 MG tablet; Take 1 tablet (100 mg total) by mouth 2 (two) times daily.  Dispense: 180 tablet; Refill: 1         There are no Patient Instructions on file for this visit.     There are no preventive care reminders to display for this patient.      Health Maintenance Topics with due status: Not Due       Topic Last Completion Date    Colorectal Cancer Screening 02/22/2023    PROSTATE-SPECIFIC ANTIGEN 03/14/2023    Diabetes Urine Screening 07/06/2023    Lipid Panel 07/06/2023    Hemoglobin A1c 07/06/2023    Influenza Vaccine Not Due       Future Appointments   Date Time Provider Department Center   8/17/2023 10:20 AM BRUNO Cohn [facility]   9/21/2023  1:00 PM AWV NURSE, [facility]            Signature:  BRUNO Cohn  [facility] [address] [place] [address]  [phone]    Date of encounter: 7/19/23

## 2023-07-28 ENCOUNTER — PATIENT OUTREACH (OUTPATIENT)
Dept: ADMINISTRATIVE | Facility: HOSPITAL | Age: 68
End: 2023-07-28

## 2023-07-28 NOTE — PROGRESS NOTES
07/28/2023   --Chart accessed for: GAP REPORT  --Care Gaps addressed: HTN  Outreach made to patient via TELEPHONE--LEFT MESSAGE  Care Everywhere updates requested and reviewed.  Media reports reviewed.  [facility] and [facility] reviewed.  Immunization Database (Immprint/MIXX) reviewed. Vaccinations uploaded: NONE  HAC abstracted.  Next appointment 08/17/2023 . Appointment notes updated to include: LAST BP NONCOMPLIANT

## 2023-08-09 ENCOUNTER — PATIENT OUTREACH (OUTPATIENT)
Dept: ADMINISTRATIVE | Facility: HOSPITAL | Age: 68
End: 2023-08-09

## 2023-08-09 NOTE — PROGRESS NOTES
08/09/2023   --Chart accessed for: GAP REPORT  --Care Gaps addressed: HTN  Outreach made to patient via TELEPHONE--NO ANSWER  Care Everywhere updates requested and reviewed.  Media reports reviewed.  [facility] and [facility] reviewed.  Immunization Database (Immprint/MIXX) reviewed. Vaccinations uploaded: NONE  HAC abstracted.  Next appointment 08/17/2023 . Appointment notes updated to include: LAST BP NONCOMPLIANT

## 2023-08-17 ENCOUNTER — OFFICE VISIT (OUTPATIENT)
Dept: FAMILY MEDICINE | Facility: CLINIC | Age: 68
End: 2023-08-17
Payer: COMMERCIAL

## 2023-08-17 VITALS
SYSTOLIC BLOOD PRESSURE: 161 MMHG | TEMPERATURE: 97 F | DIASTOLIC BLOOD PRESSURE: 87 MMHG | OXYGEN SATURATION: 100 % | HEIGHT: 69 IN | HEART RATE: 65 BPM | WEIGHT: 224 LBS | RESPIRATION RATE: 16 BRPM | BODY MASS INDEX: 33.18 KG/M2

## 2023-08-17 DIAGNOSIS — I10 PRIMARY HYPERTENSION: ICD-10-CM

## 2023-08-17 PROCEDURE — 99212 OFFICE O/P EST SF 10 MIN: CPT | Mod: ,,, | Performed by: NURSE PRACTITIONER

## 2023-08-17 PROCEDURE — 3066F NEPHROPATHY DOC TX: CPT | Mod: ,,, | Performed by: NURSE PRACTITIONER

## 2023-08-17 PROCEDURE — 3008F PR BODY MASS INDEX (BMI) DOCUMENTED: ICD-10-PCS | Mod: ,,, | Performed by: NURSE PRACTITIONER

## 2023-08-17 PROCEDURE — 3077F PR MOST RECENT SYSTOLIC BLOOD PRESSURE >= 140 MM HG: ICD-10-PCS | Mod: ,,, | Performed by: NURSE PRACTITIONER

## 2023-08-17 PROCEDURE — 3077F SYST BP >= 140 MM HG: CPT | Mod: ,,, | Performed by: NURSE PRACTITIONER

## 2023-08-17 PROCEDURE — 3061F NEG MICROALBUMINURIA REV: CPT | Mod: ,,, | Performed by: NURSE PRACTITIONER

## 2023-08-17 PROCEDURE — 3044F HG A1C LEVEL LT 7.0%: CPT | Mod: ,,, | Performed by: NURSE PRACTITIONER

## 2023-08-17 PROCEDURE — 1160F RVW MEDS BY RX/DR IN RCRD: CPT | Mod: ,,, | Performed by: NURSE PRACTITIONER

## 2023-08-17 PROCEDURE — 3079F PR MOST RECENT DIASTOLIC BLOOD PRESSURE 80-89 MM HG: ICD-10-PCS | Mod: ,,, | Performed by: NURSE PRACTITIONER

## 2023-08-17 PROCEDURE — 1160F PR REVIEW ALL MEDS BY PRESCRIBER/CLIN PHARMACIST DOCUMENTED: ICD-10-PCS | Mod: ,,, | Performed by: NURSE PRACTITIONER

## 2023-08-17 PROCEDURE — 3061F PR NEG MICROALBUMINURIA RESULT DOCUMENTED/REVIEW: ICD-10-PCS | Mod: ,,, | Performed by: NURSE PRACTITIONER

## 2023-08-17 PROCEDURE — 3008F BODY MASS INDEX DOCD: CPT | Mod: ,,, | Performed by: NURSE PRACTITIONER

## 2023-08-17 PROCEDURE — 1159F MED LIST DOCD IN RCRD: CPT | Mod: ,,, | Performed by: NURSE PRACTITIONER

## 2023-08-17 PROCEDURE — 3066F PR DOCUMENTATION OF TREATMENT FOR NEPHROPATHY: ICD-10-PCS | Mod: ,,, | Performed by: NURSE PRACTITIONER

## 2023-08-17 PROCEDURE — 99212 PR OFFICE/OUTPT VISIT, EST, LEVL II, 10-19 MIN: ICD-10-PCS | Mod: ,,, | Performed by: NURSE PRACTITIONER

## 2023-08-17 PROCEDURE — 3044F PR MOST RECENT HEMOGLOBIN A1C LEVEL <7.0%: ICD-10-PCS | Mod: ,,, | Performed by: NURSE PRACTITIONER

## 2023-08-17 PROCEDURE — 1159F PR MEDICATION LIST DOCUMENTED IN MEDICAL RECORD: ICD-10-PCS | Mod: ,,, | Performed by: NURSE PRACTITIONER

## 2023-08-17 PROCEDURE — 3079F DIAST BP 80-89 MM HG: CPT | Mod: ,,, | Performed by: NURSE PRACTITIONER

## 2023-08-17 RX ORDER — HYDRALAZINE HYDROCHLORIDE 100 MG/1
TABLET, FILM COATED ORAL
COMMUNITY
Start: 2023-07-19 | End: 2023-08-17 | Stop reason: SDUPTHER

## 2023-08-17 RX ORDER — HYDRALAZINE HYDROCHLORIDE 100 MG/1
100 TABLET, FILM COATED ORAL 2 TIMES DAILY
Qty: 180 TABLET | Refills: 1 | Status: SHIPPED | OUTPATIENT
Start: 2023-08-17 | End: 2023-11-27 | Stop reason: SDUPTHER

## 2023-08-17 NOTE — PROGRESS NOTES
BRUNO Cohn   [facility] [address] [place] [address]  [phone]      PATIENT NAME: Joey Hughes  : 1955  DATE: 23  MRN: [id number]      Billing Provider: BRUNO Cohn  Level of Service:   Patient PCP Information       Provider PCP Type    BRUNO Cohn General            Reason for Visit / Chief Complaint: Follow-up and Hypertension       Update PCP  Update Chief Complaint         History of Present Illness / Problem Focused Workflow     Joey Hughes presents to the clinic with Follow-up and Hypertension     Patient presents for one-month follow up blood pressure control blood pressures are improving, but they are not at goal. we will add hydralazine 100 mg b.i.d..  Home medications reviewed with the patient he verbalized understanding.  Encouraged patient home blood pressure monitoring and to bring log to next appointment.  Discussed low-salt diet patient verbalized understanding.    Advised to monitor BP at home. Advised on optimal BP readings - SBP < 130 & DBP < 80. Advised to call office for any persistent BP elevation and may have to prescribe or adjust BP med(s).  Recommended DASH diet, stay well hydrated with water daily, eliminate or decrease caffeinated and high calorie drinks, increase physical activity, and lose weight if BMI > 25.0.          Review of Systems     Review of Systems   Constitutional:  Negative for fatigue and fever.   HENT:  Negative for nasal congestion and sore throat.    Eyes:  Negative for visual disturbance.   Respiratory:  Negative for chest tightness and shortness of breath.    Cardiovascular:  Negative for chest pain and leg swelling.   Gastrointestinal:  Negative for abdominal pain, change in bowel habit and change in bowel habit.   Endocrine: Negative for polydipsia, polyphagia and polyuria.   Genitourinary:  Negative for dysuria and  hematuria.   Musculoskeletal:  Negative for back pain and leg pain.   Integumentary:  Negative for rash.   Neurological:  Negative for dizziness, syncope, weakness and light-headedness.        Medical / Social / Family History     Past Medical History:   Diagnosis Date    High cholesterol     Hypertension        History reviewed. No pertinent surgical history.    Social History  Mr. Hughes  reports that he has never smoked. He has never used smokeless tobacco. He reports current alcohol use of about 6.0 standard drinks of alcohol per week. He reports that he does not use drugs.    Family History  Mr. Hughes's family history is not on file.    Medications and Allergies     Medications  Outpatient Medications Marked as Taking for the 8/17/23 encounter (Office Visit) with Debbie Smith ACNP   Medication Sig Dispense Refill    amLODIPine (NORVASC) 10 MG tablet Take 1 tablet (10 mg total) by mouth once daily. 90 tablet 1    losartan-hydrochlorothiazide 100-25 mg (HYZAAR) 100-25 mg per tablet Take 1 tablet by mouth once daily. 90 tablet 1    metoprolol succinate (TOPROL-XL) 25 MG 24 hr tablet Take 1 tablet (25 mg total) by mouth once daily. 90 tablet 1    rosuvastatin (CRESTOR) 5 MG tablet Take 1 tablet (5 mg total) by mouth once daily. 90 tablet 1       Allergies  Review of patient's allergies indicates:  No Known Allergies    Physical Examination     Vitals:    08/17/23 0948   BP: (!) 161/87   Pulse: 65   Resp: 16   Temp: 97.4 °F (36.3 °C)     Physical Exam  Eyes:      Pupils: Pupils are equal, round, and reactive to light.   Cardiovascular:      Rate and Rhythm: Normal rate and regular rhythm.      Heart sounds: Normal heart sounds. No murmur heard.  Pulmonary:      Breath sounds: Normal breath sounds. No wheezing, rhonchi or rales.   Abdominal:      General: Bowel sounds are normal.   Musculoskeletal:         General: No swelling.      Cervical back: Normal range of motion and neck supple.   Skin:      General: Skin is warm and dry.   Neurological:      Mental Status: He is alert and oriented to person, place, and time.          Lab Results   Component Value Date    WBC 4.70 03/14/2023    HGB 13.6 03/14/2023    HCT 42.1 03/14/2023    MCV 88.6 03/14/2023     03/14/2023        Sodium   Date Value Ref Range Status   07/06/2023 139 136 - 145 mmol/L Final     Potassium   Date Value Ref Range Status   07/06/2023 4.0 3.5 - 5.1 mmol/L Final     Chloride   Date Value Ref Range Status   07/06/2023 106 98 - 107 mmol/L Final     CO2   Date Value Ref Range Status   07/06/2023 28 21 - 32 mmol/L Final     Glucose   Date Value Ref Range Status   07/06/2023 127 (H) 74 - 106 mg/dL Final     BUN   Date Value Ref Range Status   07/06/2023 15 7 - 18 mg/dL Final     Creatinine   Date Value Ref Range Status   07/06/2023 1.23 0.70 - 1.30 mg/dL Final     Calcium   Date Value Ref Range Status   07/06/2023 9.2 8.5 - 10.1 mg/dL Final     Total Protein   Date Value Ref Range Status   07/06/2023 7.3 6.4 - 8.2 g/dL Final     Albumin   Date Value Ref Range Status   07/06/2023 3.9 3.5 - 5.0 g/dL Final     Bilirubin, Total   Date Value Ref Range Status   07/06/2023 0.5 >0.0 - 1.2 mg/dL Final     Alk Phos   Date Value Ref Range Status   07/06/2023 50 45 - 115 U/L Final     AST   Date Value Ref Range Status   07/06/2023 29 15 - 37 U/L Final     ALT   Date Value Ref Range Status   07/06/2023 40 16 - 61 U/L Final     Anion Gap   Date Value Ref Range Status   07/06/2023 9 7 - 16 mmol/L Final     eGFR   Date Value Ref Range Status   07/06/2023 65 >=60 mL/min/1.73m2 Final      Lab Results   Component Value Date    HGBA1C 6.0 07/06/2023      Lab Results   Component Value Date    CHOL 205 (H) 07/06/2023    CHOL 207 (H) 03/14/2023    CHOL 229 (H) 12/12/2022     Lab Results   Component Value Date    HDL 52 07/06/2023    HDL 56 03/14/2023    HDL 63 (H) 12/12/2022     Lab Results   Component Value Date    LDLCALC 106 07/06/2023    LDLCALC 115 03/14/2023  "   LDLCALC 137 12/12/2022     No results found for: "DLDL"  Lab Results   Component Value Date    TRIG 237 (H) 07/06/2023    TRIG 178 (H) 03/14/2023    TRIG 145 12/12/2022     Lab Results   Component Value Date    CHOLHDL 3.9 07/06/2023    CHOLHDL 3.7 03/14/2023    CHOLHDL 3.6 12/12/2022      Lab Results   Component Value Date    TSH 1.100 02/16/2022        Assessment and Plan (including Health Maintenance)      Problem List  Smart Sets  Document Outside HM   :    Plan:     1. Primary hypertension  Assessment & Plan:  BP Readings from Last 3 Encounters:   08/17/23 (!) 161/87   07/19/23 (!) 180/92   07/06/23 (!) 197/84     Blood pressure is improving  Continue home meds  Restart hydralazine 100 mg b.i.d.  Encouraged home monitoring and bring log next appointment      Other orders  -     hydrALAZINE (APRESOLINE) 100 MG tablet; Take 1 tablet (100 mg total) by mouth 2 (two) times daily.  Dispense: 180 tablet; Refill: 1         Patient Instructions   Dash diet  BP edu     Health Maintenance Due   Topic Date Due    TETANUS VACCINE  Never done         Health Maintenance Topics with due status: Not Due       Topic Last Completion Date    Colorectal Cancer Screening 02/22/2023    PROSTATE-SPECIFIC ANTIGEN 03/14/2023    Diabetes Urine Screening 07/06/2023    Lipid Panel 07/06/2023    Hemoglobin A1c 07/06/2023    Influenza Vaccine Not Due       Future Appointments   Date Time Provider Department Center   8/17/2023 10:20 AM Debbie Smith ACNP [facility]   9/21/2023  1:00 PM AWV NURSE, [facility]   11/27/2023 10:20 AM Debbie Smith ACNP [facility]            Signature:  [facility]ohn [facility]  [address] [place] [address] [place] [address]  [phone]    Date of encounter: 8/17/23    "

## 2023-08-17 NOTE — ASSESSMENT & PLAN NOTE
BP Readings from Last 3 Encounters:   08/17/23 (!) 161/87   07/19/23 (!) 180/92   07/06/23 (!) 197/84     Blood pressure is improving  Continue home meds  Restart hydralazine 100 mg b.i.d.  Encouraged home monitoring and bring log next appointment

## 2023-09-21 ENCOUNTER — OFFICE VISIT (OUTPATIENT)
Dept: FAMILY MEDICINE | Facility: CLINIC | Age: 68
End: 2023-09-21
Payer: COMMERCIAL

## 2023-09-21 VITALS
OXYGEN SATURATION: 99 % | HEART RATE: 68 BPM | DIASTOLIC BLOOD PRESSURE: 87 MMHG | BODY MASS INDEX: 32.58 KG/M2 | HEIGHT: 69 IN | RESPIRATION RATE: 19 BRPM | SYSTOLIC BLOOD PRESSURE: 158 MMHG | WEIGHT: 220 LBS

## 2023-09-21 DIAGNOSIS — Z00.00 ENCOUNTER FOR INITIAL ANNUAL WELLNESS VISIT (AWV) IN MEDICARE PATIENT: Primary | ICD-10-CM

## 2023-09-21 DIAGNOSIS — E78.5 HYPERLIPIDEMIA, UNSPECIFIED HYPERLIPIDEMIA TYPE: ICD-10-CM

## 2023-09-21 DIAGNOSIS — Z00.00 ENCOUNTER FOR PREVENTIVE HEALTH EXAMINATION: ICD-10-CM

## 2023-09-21 DIAGNOSIS — I10 HYPERTENSION, UNSPECIFIED TYPE: ICD-10-CM

## 2023-09-21 DIAGNOSIS — E66.3 OVERWEIGHT: ICD-10-CM

## 2023-09-21 PROCEDURE — 3079F DIAST BP 80-89 MM HG: CPT | Mod: ,,, | Performed by: NURSE PRACTITIONER

## 2023-09-21 PROCEDURE — 3044F HG A1C LEVEL LT 7.0%: CPT | Mod: ,,, | Performed by: NURSE PRACTITIONER

## 2023-09-21 PROCEDURE — 3288F PR FALLS RISK ASSESSMENT DOCUMENTED: ICD-10-PCS | Mod: ,,, | Performed by: NURSE PRACTITIONER

## 2023-09-21 PROCEDURE — 3077F SYST BP >= 140 MM HG: CPT | Mod: ,,, | Performed by: NURSE PRACTITIONER

## 2023-09-21 PROCEDURE — 3008F BODY MASS INDEX DOCD: CPT | Mod: ,,, | Performed by: NURSE PRACTITIONER

## 2023-09-21 PROCEDURE — 1101F PT FALLS ASSESS-DOCD LE1/YR: CPT | Mod: ,,, | Performed by: NURSE PRACTITIONER

## 2023-09-21 PROCEDURE — 1159F MED LIST DOCD IN RCRD: CPT | Mod: ,,, | Performed by: NURSE PRACTITIONER

## 2023-09-21 PROCEDURE — 90694 FLU VACCINE - QUADRIVALENT - ADJUVANTED: ICD-10-PCS | Mod: ,,, | Performed by: NURSE PRACTITIONER

## 2023-09-21 PROCEDURE — 1126F PR PAIN SEVERITY QUANTIFIED, NO PAIN PRESENT: ICD-10-PCS | Mod: ,,, | Performed by: NURSE PRACTITIONER

## 2023-09-21 PROCEDURE — 3008F PR BODY MASS INDEX (BMI) DOCUMENTED: ICD-10-PCS | Mod: ,,, | Performed by: NURSE PRACTITIONER

## 2023-09-21 PROCEDURE — 1160F PR REVIEW ALL MEDS BY PRESCRIBER/CLIN PHARMACIST DOCUMENTED: ICD-10-PCS | Mod: ,,, | Performed by: NURSE PRACTITIONER

## 2023-09-21 PROCEDURE — G0438 PR WELCOME MEDICARE ANNUAL WELLNESS INITIAL VISIT: ICD-10-PCS | Mod: ,,, | Performed by: NURSE PRACTITIONER

## 2023-09-21 PROCEDURE — 1101F PR PT FALLS ASSESS DOC 0-1 FALLS W/OUT INJ PAST YR: ICD-10-PCS | Mod: ,,, | Performed by: NURSE PRACTITIONER

## 2023-09-21 PROCEDURE — 3066F NEPHROPATHY DOC TX: CPT | Mod: ,,, | Performed by: NURSE PRACTITIONER

## 2023-09-21 PROCEDURE — 1159F PR MEDICATION LIST DOCUMENTED IN MEDICAL RECORD: ICD-10-PCS | Mod: ,,, | Performed by: NURSE PRACTITIONER

## 2023-09-21 PROCEDURE — 3061F PR NEG MICROALBUMINURIA RESULT DOCUMENTED/REVIEW: ICD-10-PCS | Mod: ,,, | Performed by: NURSE PRACTITIONER

## 2023-09-21 PROCEDURE — 3288F FALL RISK ASSESSMENT DOCD: CPT | Mod: ,,, | Performed by: NURSE PRACTITIONER

## 2023-09-21 PROCEDURE — 3077F PR MOST RECENT SYSTOLIC BLOOD PRESSURE >= 140 MM HG: ICD-10-PCS | Mod: ,,, | Performed by: NURSE PRACTITIONER

## 2023-09-21 PROCEDURE — 90694 VACC AIIV4 NO PRSRV 0.5ML IM: CPT | Mod: ,,, | Performed by: NURSE PRACTITIONER

## 2023-09-21 PROCEDURE — 1160F RVW MEDS BY RX/DR IN RCRD: CPT | Mod: ,,, | Performed by: NURSE PRACTITIONER

## 2023-09-21 PROCEDURE — G0008 ADMIN INFLUENZA VIRUS VAC: HCPCS | Mod: ,,, | Performed by: NURSE PRACTITIONER

## 2023-09-21 PROCEDURE — 3066F PR DOCUMENTATION OF TREATMENT FOR NEPHROPATHY: ICD-10-PCS | Mod: ,,, | Performed by: NURSE PRACTITIONER

## 2023-09-21 PROCEDURE — 1126F AMNT PAIN NOTED NONE PRSNT: CPT | Mod: ,,, | Performed by: NURSE PRACTITIONER

## 2023-09-21 PROCEDURE — 3044F PR MOST RECENT HEMOGLOBIN A1C LEVEL <7.0%: ICD-10-PCS | Mod: ,,, | Performed by: NURSE PRACTITIONER

## 2023-09-21 PROCEDURE — G0008 FLU VACCINE - QUADRIVALENT - ADJUVANTED: ICD-10-PCS | Mod: ,,, | Performed by: NURSE PRACTITIONER

## 2023-09-21 PROCEDURE — G0438 PPPS, INITIAL VISIT: HCPCS | Mod: ,,, | Performed by: NURSE PRACTITIONER

## 2023-09-21 PROCEDURE — 3061F NEG MICROALBUMINURIA REV: CPT | Mod: ,,, | Performed by: NURSE PRACTITIONER

## 2023-09-21 PROCEDURE — 3079F PR MOST RECENT DIASTOLIC BLOOD PRESSURE 80-89 MM HG: ICD-10-PCS | Mod: ,,, | Performed by: NURSE PRACTITIONER

## 2023-09-21 NOTE — PROGRESS NOTES
[facility]       PATIENT NAME: Joey Hughes   : 1955    AGE: 68 y.o. DATE: 2023   MRN: [id number]        Reason for Visit / Chief Complaint: Medicare AWV (WELLCARE WELLNESS INITIAL VISIT)        Joey Hughes presents for an Initial Medicare AWV today.     The following components were reviewed and updated:    Medical/Social/Family History:  Past Medical History:   Diagnosis Date    High cholesterol     Hypertension         Family History   Problem Relation Age of Onset    Cancer Mother         Ovarian    Diabetes Brother     Diabetes Brother         Social History     Tobacco Use   Smoking Status Never   Smokeless Tobacco Never      Social History     Substance and Sexual Activity   Alcohol Use Yes    Alcohol/week: 2.0 standard drinks of alcohol    Types: 2 Cans of beer per week       Family History   Problem Relation Age of Onset    Cancer Mother         Ovarian    Diabetes Brother     Diabetes Brother        History reviewed. No pertinent surgical history.      Allergies and Current Medications   Review of patient's allergies indicates:  No Known Allergies    Current Outpatient Medications:     amLODIPine (NORVASC) 10 MG tablet, Take 1 tablet (10 mg total) by mouth once daily., Disp: 90 tablet, Rfl: 1    hydrALAZINE (APRESOLINE) 100 MG tablet, Take 1 tablet (100 mg total) by mouth 2 (two) times daily., Disp: 180 tablet, Rfl: 1    losartan-hydrochlorothiazide 100-25 mg (HYZAAR) 100-25 mg per tablet, Take 1 tablet by mouth once daily., Disp: 90 tablet, Rfl: 1    metoprolol succinate (TOPROL-XL) 25 MG 24 hr tablet, Take 1 tablet (25 mg total) by mouth once daily., Disp: 90 tablet, Rfl: 1    mv, min #36-iron,carbonyl-FA (GERITOL COMPLETE) 16 mg iron- 0.38 mg Tab, Take 1 tablet by mouth once daily., Disp: , Rfl:     rosuvastatin (CRESTOR) 5 MG tablet, Take 1 tablet (5 mg total) by mouth once daily., Disp: 90 tablet, Rfl: 1    Health Risk  Assessment   Fall Risk: No   Obesity: BMI 32.49     Advance Directive:  Does not have an advanced directive. Verbal education and written education included in today's AVS.    X Patient is interested in learning more about how to make advanced directives.  I provided them paperwork and offered to discuss this with them.   Depression: PHQ9 -2    HTN: 163/94;   158/87   T2DM: A1C- 6.0    Tobacco use: Denies tobacco use  STI: not at risk    Alcohol misuse: Reports alcohol use Cage Score: 1   Statin Use: Continue statin use. Encouraged heart healthy diet & exercise   Mini Co      Health Risk Assessment  What is your age?: 65-69  Are you male or female?: Male  During the past four weeks, how much have you been bothered by emotional problems such as feeling anxious, depressed, irritable, sad, or downhearted and blue?: Not at all  During the past five weeks, has your physical and/or emotional health limited your social activities with family, friends, neighbors, or groups?: Not at all  During the past four weeks, how much bodily pain have you generally had?: No pain  During the past four weeks, was someone available to help if you needed and wanted help?: Yes, as much as I wanted  During the past four weeks, what was the hardest physical activity you could do for at least two minutes?: Moderate  Can you get to places out of walking distance without help?  (For example, can you travel alone on buses or taxis, or drive your own car?): Yes  Can you go shopping for groceries or clothes without someone's help?: Yes  Can you prepare your own meals?: Yes  Can you do your own housework without help?: Yes  Because of any health problems, do you need the help of another person with your personal care needs such as eating, bathing, dressing, or getting around the house?: Yes  Can you handle your own money without help?: Yes  During the past four weeks, how would you rate your health in general?: Very good  How have things been  going for you during the past four weeks?: Pretty well  Are you having difficulties driving your car?: No  Do you always fasten your seat belt when you are in a car?: Yes, usually  How often in the past four weeks have you been bothered by falling or dizzy when standing up?: Never  How often in the past four weeks have you been bothered by sexual problems?: Never  How often in the past four weeks have you been bothered by trouble eating well?: Sometimes  How often in the past four weeks have you been bothered by teeth or denture problems?: Never  How often in the past four weeks have you been bothered with problems using the telephone?: Never  How often in the past four weeks have you been bothered by tiredness or fatigue?: Sometimes  Have you fallen two or more times in the past year?: No  Are you afraid of falling?: No  Are you a smoker?: No  During the past four weeks, how many drinks of wine, beer, or other alcoholic beverages did you have?: 10 or more drinks per week  Do you exercise for about 20 minutes three or more days a week?: Yes, some of the time  Have you been given any information to help you with hazards in your house that might hurt you?: Yes  Have you been given any information to help you with keeping track of your medications?: Yes  How often do you have trouble taking medicines the way you've been told to take them?: I always take them as prescribed  How confident are you that you can control and manage most of your health problems?: Very confident  What is your race? (Check all that apply.):     Opioid Risk Assessment:  Opioid risk assessment performed today. Patient is LOW risk for opoid abuse.     Opioid Risk Score         Value Time User    Opioid Risk Score  0 9/21/2023  1:17 PM Akua Solo, RN                 Health Maintenance   Last eye exam: N/A   Last CV screen with lipids: 07/06/2023   Diabetes screening with fasting glucose or A1c: 07/06/2023   Colonoscopy:  2023- Repeat in 3 years   Flu Vaccine: Given today   Pneumonia vaccines: 2022   COVID vaccine: x2   Hep B vaccine: Not at risk   DEXA: N/A   Last pap/pelvic: N/A   Last Mammogram: N/A   Last PSA screen: 2023- Normal   AAA screening: N/A (once in lifetime for males 65-75 who have smoked > 100 cigarettes in lifetime)  HIV Screein2022- Nonreactive  Hepatitis C Screen: 2022- Nonreactive  Low Dose CT Scan: N/A    Health Maintenance Topics with due status: Not Due       Topic Last Completion Date    Colorectal Cancer Screening 2023    PROSTATE-SPECIFIC ANTIGEN 2023    Diabetes Urine Screening 2023    Lipid Panel 2023    Hemoglobin A1c 2023     Health Maintenance Due   Topic Date Due    TETANUS VACCINE  Never done    Influenza Vaccine (1) Never done       Incontinence  Bowel: No  Bladder: No    Lab results available in Epic or see dates from [facility] above:   Lab Results   Component Value Date    CHOL 205 (H) 2023    CHOL 207 (H) 2023    CHOL 229 (H) 2022     Lab Results   Component Value Date    HDL 52 2023    HDL 56 2023    HDL 63 (H) 2022     Lab Results   Component Value Date    LDLCALC 106 2023    LDLCALC 115 2023    LDLCALC 137 2022     Lab Results   Component Value Date    TRIG 237 (H) 2023    TRIG 178 (H) 2023    TRIG 145 2022     Lab Results   Component Value Date    CHOLHDL 3.9 2023    CHOLHDL 3.7 2023    CHOLHDL 3.6 2022       Lab Results   Component Value Date    HGBA1C 6.0 2023       Sodium   Date Value Ref Range Status   2023 139 136 - 145 mmol/L Final     Potassium   Date Value Ref Range Status   2023 4.0 3.5 - 5.1 mmol/L Final     Chloride   Date Value Ref Range Status   2023 106 98 - 107 mmol/L Final     CO2   Date Value Ref Range Status   2023 28 21 - 32 mmol/L Final     Glucose   Date Value Ref Range Status   2023 127 (H)  "74 - 106 mg/dL Final     BUN   Date Value Ref Range Status   07/06/2023 15 7 - 18 mg/dL Final     Creatinine   Date Value Ref Range Status   07/06/2023 1.23 0.70 - 1.30 mg/dL Final     Calcium   Date Value Ref Range Status   07/06/2023 9.2 8.5 - 10.1 mg/dL Final     Total Protein   Date Value Ref Range Status   07/06/2023 7.3 6.4 - 8.2 g/dL Final     Albumin   Date Value Ref Range Status   07/06/2023 3.9 3.5 - 5.0 g/dL Final     Bilirubin, Total   Date Value Ref Range Status   07/06/2023 0.5 >0.0 - 1.2 mg/dL Final     Alk Phos   Date Value Ref Range Status   07/06/2023 50 45 - 115 U/L Final     AST   Date Value Ref Range Status   07/06/2023 29 15 - 37 U/L Final     ALT   Date Value Ref Range Status   07/06/2023 40 16 - 61 U/L Final     Anion Gap   Date Value Ref Range Status   07/06/2023 9 7 - 16 mmol/L Final     eGFR   Date Value Ref Range Status   06/09/2022 83 >=60 mL/min/1.73m² Final         Lab Results   Component Value Date    PSA 0.324 03/14/2023    PSA 0.276 02/16/2022           Care Team   PCP: BRUNO Delaney           **See Completed Assessments for Annual Wellness visit within the encounter summary    The following assessments were completed & reviewed:  Depression Screening  Cognitive function Screening  Timed Get Up Test  Whisper Test  Vision Screen  Health Risk Assessment  Checklist of ADLs and IADLs      Objective  Vitals:    09/21/23 1311 09/21/23 1325   BP: (!) 163/94 (!) 158/87   Pulse: 68    Resp: 19    SpO2: 99%    Weight: 99.8 kg (220 lb)    Height: 5' 9" (1.753 m)    PainSc: 0-No pain       Body mass index is 32.49 kg/m².  Ideal body weight: 70.7 kg (155 lb 13.8 oz)       Physical Exam  Constitutional:       General: He is not in acute distress.     Appearance: Normal appearance.   HENT:      Head: Normocephalic.   Eyes:      Pupils: Pupils are equal, round, and reactive to light.   Cardiovascular:      Rate and Rhythm: Normal rate and regular rhythm.      Heart sounds: Normal heart " sounds. No murmur heard.  Pulmonary:      Effort: Pulmonary effort is normal.      Breath sounds: Normal breath sounds.   Abdominal:      General: Bowel sounds are normal. There is no distension.      Hernia: No hernia is present.   Musculoskeletal:         General: No swelling or tenderness.      Right lower leg: No edema.      Left lower leg: No edema.   Skin:     General: Skin is warm and dry.   Neurological:      General: No focal deficit present.      Mental Status: He is alert and oriented to person, place, and time.           Assessment:     1. Encounter for initial annual wellness visit (AWV) in Medicare patient    2. Hypertension, unspecified type    3. Hyperlipidemia, unspecified hyperlipidemia type    4. Overweight    5. BMI 32.0-32.9,adult    6. Encounter for preventive health examination         Plan:    Referrals/Orders:  High-dose flu vaccine     Advised to call office if does not hear from anyone with referral appt within 2-3 weeks to check on status of referral. Voiced understanding.    Keep current on appts & continue medications as ordered. Encouraged diet & exercise to decrease weight/BMI.  Encouraged pt to quit drinking.      Discussed and provided with a screening schedule and personal prevention plan in accordance with USPSTF age appropriate recommendations and Medicare screening guidelines.   Education, counseling, and referrals were provided as needed.  After Visit Summary printed and given to patient which includes written education and a list of any referrals if indicated. All education discussed with patient & handouts given to patient.      F/u plan for yearly AWV.    Signature: BRUNO Delaney

## 2023-09-21 NOTE — PATIENT INSTRUCTIONS
Counseling and Referral of Other Preventative  (Italic type indicates deductible and co-insurance are waived)    Patient Name: Joey Hughes  Today's Date: 9/21/2023    Health Maintenance       Date Due Completion Date    TETANUS VACCINE Never done ---    Influenza Vaccine (1) Never done ---    Shingles Vaccine (1 of 2) 08/17/2024 (Originally 5/20/2007) ---    PROSTATE-SPECIFIC ANTIGEN 03/14/2024 3/14/2023    Diabetes Urine Screening 07/06/2024 7/6/2023    Hemoglobin A1c 07/06/2024 7/6/2023    Colorectal Cancer Screening 02/22/2026 2/22/2023    Lipid Panel 07/06/2028 7/6/2023        Orders Placed This Encounter   Procedures    Influenza - Quadrivalent (Adjuvanted)       The following information is provided to all patients.  This information is to help you find resources for any of the problems found today that may be affecting your health:                Living healthy guide: www.[facility].[place].[facility]      Understanding Diabetes: www.diabetes.org      Eating healthy: www.cdc.gov/healthyweight      CDC home safety checklist: www.cdc.gov/steadi/patient.html      Agency on Aging: www.goea.[place].[facility]      Alcoholics anonymous (AA): www.aa.org      Physical Activity: www.tena.nih.gov/eg0icbf      Tobacco use: www.quitwithusla.org

## 2023-10-09 DIAGNOSIS — Z71.89 COMPLEX CARE COORDINATION: ICD-10-CM

## 2023-11-27 ENCOUNTER — OFFICE VISIT (OUTPATIENT)
Dept: FAMILY MEDICINE | Facility: CLINIC | Age: 68
End: 2023-11-27
Payer: COMMERCIAL

## 2023-11-27 VITALS
SYSTOLIC BLOOD PRESSURE: 148 MMHG | RESPIRATION RATE: 16 BRPM | HEIGHT: 69 IN | WEIGHT: 225.38 LBS | TEMPERATURE: 99 F | OXYGEN SATURATION: 98 % | DIASTOLIC BLOOD PRESSURE: 80 MMHG | HEART RATE: 98 BPM | BODY MASS INDEX: 33.38 KG/M2

## 2023-11-27 DIAGNOSIS — R73.03 PRE-DIABETES: ICD-10-CM

## 2023-11-27 DIAGNOSIS — E78.5 HYPERLIPIDEMIA, UNSPECIFIED HYPERLIPIDEMIA TYPE: ICD-10-CM

## 2023-11-27 DIAGNOSIS — I10 HYPERTENSION, UNSPECIFIED TYPE: Primary | ICD-10-CM

## 2023-11-27 LAB
ALBUMIN SERPL BCP-MCNC: 4 G/DL (ref 3.5–5)
ALBUMIN/GLOB SERPL: 1.2 {RATIO}
ALP SERPL-CCNC: 56 U/L (ref 45–115)
ALT SERPL W P-5'-P-CCNC: 43 U/L (ref 16–61)
ANION GAP SERPL CALCULATED.3IONS-SCNC: 10 MMOL/L (ref 7–16)
AST SERPL W P-5'-P-CCNC: 30 U/L (ref 15–37)
BILIRUB SERPL-MCNC: 0.6 MG/DL (ref ?–1.2)
BUN SERPL-MCNC: 18 MG/DL (ref 7–18)
BUN/CREAT SERPL: 17 (ref 6–20)
CALCIUM SERPL-MCNC: 9.9 MG/DL (ref 8.5–10.1)
CHLORIDE SERPL-SCNC: 106 MMOL/L (ref 98–107)
CHOLEST SERPL-MCNC: 208 MG/DL (ref 0–200)
CHOLEST/HDLC SERPL: 3.4 {RATIO}
CO2 SERPL-SCNC: 26 MMOL/L (ref 21–32)
CREAT SERPL-MCNC: 1.03 MG/DL (ref 0.7–1.3)
EGFR (NO RACE VARIABLE) (RUSH/TITUS): 80 ML/MIN/1.73M2
EST. AVERAGE GLUCOSE BLD GHB EST-MCNC: 123 MG/DL
GLOBULIN SER-MCNC: 3.4 G/DL (ref 2–4)
GLUCOSE SERPL-MCNC: 128 MG/DL (ref 74–106)
HBA1C MFR BLD HPLC: 5.9 % (ref 4.5–6.6)
HDLC SERPL-MCNC: 61 MG/DL (ref 40–60)
LDLC SERPL CALC-MCNC: 120 MG/DL
LDLC/HDLC SERPL: 2 {RATIO}
NONHDLC SERPL-MCNC: 147 MG/DL
POTASSIUM SERPL-SCNC: 3.6 MMOL/L (ref 3.5–5.1)
PROT SERPL-MCNC: 7.4 G/DL (ref 6.4–8.2)
SODIUM SERPL-SCNC: 138 MMOL/L (ref 136–145)
TRIGL SERPL-MCNC: 133 MG/DL (ref 35–150)
VLDLC SERPL-MCNC: 27 MG/DL

## 2023-11-27 PROCEDURE — 3066F PR DOCUMENTATION OF TREATMENT FOR NEPHROPATHY: ICD-10-PCS | Mod: ,,, | Performed by: NURSE PRACTITIONER

## 2023-11-27 PROCEDURE — 3061F PR NEG MICROALBUMINURIA RESULT DOCUMENTED/REVIEW: ICD-10-PCS | Mod: ,,, | Performed by: NURSE PRACTITIONER

## 2023-11-27 PROCEDURE — 83036 HEMOGLOBIN A1C: ICD-10-PCS | Mod: ,,, | Performed by: CLINICAL MEDICAL LABORATORY

## 2023-11-27 PROCEDURE — 80053 COMPREHEN METABOLIC PANEL: CPT | Mod: ,,, | Performed by: CLINICAL MEDICAL LABORATORY

## 2023-11-27 PROCEDURE — 3044F HG A1C LEVEL LT 7.0%: CPT | Mod: ,,, | Performed by: NURSE PRACTITIONER

## 2023-11-27 PROCEDURE — 99213 PR OFFICE/OUTPT VISIT, EST, LEVL III, 20-29 MIN: ICD-10-PCS | Mod: ,,, | Performed by: NURSE PRACTITIONER

## 2023-11-27 PROCEDURE — 3008F PR BODY MASS INDEX (BMI) DOCUMENTED: ICD-10-PCS | Mod: ,,, | Performed by: NURSE PRACTITIONER

## 2023-11-27 PROCEDURE — 3061F NEG MICROALBUMINURIA REV: CPT | Mod: ,,, | Performed by: NURSE PRACTITIONER

## 2023-11-27 PROCEDURE — 1160F RVW MEDS BY RX/DR IN RCRD: CPT | Mod: ,,, | Performed by: NURSE PRACTITIONER

## 2023-11-27 PROCEDURE — 3077F PR MOST RECENT SYSTOLIC BLOOD PRESSURE >= 140 MM HG: ICD-10-PCS | Mod: ,,, | Performed by: NURSE PRACTITIONER

## 2023-11-27 PROCEDURE — 83036 HEMOGLOBIN GLYCOSYLATED A1C: CPT | Mod: ,,, | Performed by: CLINICAL MEDICAL LABORATORY

## 2023-11-27 PROCEDURE — 80053 COMPREHENSIVE METABOLIC PANEL: ICD-10-PCS | Mod: ,,, | Performed by: CLINICAL MEDICAL LABORATORY

## 2023-11-27 PROCEDURE — 3044F PR MOST RECENT HEMOGLOBIN A1C LEVEL <7.0%: ICD-10-PCS | Mod: ,,, | Performed by: NURSE PRACTITIONER

## 2023-11-27 PROCEDURE — 1159F PR MEDICATION LIST DOCUMENTED IN MEDICAL RECORD: ICD-10-PCS | Mod: ,,, | Performed by: NURSE PRACTITIONER

## 2023-11-27 PROCEDURE — 3079F PR MOST RECENT DIASTOLIC BLOOD PRESSURE 80-89 MM HG: ICD-10-PCS | Mod: ,,, | Performed by: NURSE PRACTITIONER

## 2023-11-27 PROCEDURE — 80061 LIPID PANEL: ICD-10-PCS | Mod: ,,, | Performed by: CLINICAL MEDICAL LABORATORY

## 2023-11-27 PROCEDURE — 3066F NEPHROPATHY DOC TX: CPT | Mod: ,,, | Performed by: NURSE PRACTITIONER

## 2023-11-27 PROCEDURE — 3008F BODY MASS INDEX DOCD: CPT | Mod: ,,, | Performed by: NURSE PRACTITIONER

## 2023-11-27 PROCEDURE — 3079F DIAST BP 80-89 MM HG: CPT | Mod: ,,, | Performed by: NURSE PRACTITIONER

## 2023-11-27 PROCEDURE — 1159F MED LIST DOCD IN RCRD: CPT | Mod: ,,, | Performed by: NURSE PRACTITIONER

## 2023-11-27 PROCEDURE — 3077F SYST BP >= 140 MM HG: CPT | Mod: ,,, | Performed by: NURSE PRACTITIONER

## 2023-11-27 PROCEDURE — 99213 OFFICE O/P EST LOW 20 MIN: CPT | Mod: ,,, | Performed by: NURSE PRACTITIONER

## 2023-11-27 PROCEDURE — 1160F PR REVIEW ALL MEDS BY PRESCRIBER/CLIN PHARMACIST DOCUMENTED: ICD-10-PCS | Mod: ,,, | Performed by: NURSE PRACTITIONER

## 2023-11-27 PROCEDURE — 80061 LIPID PANEL: CPT | Mod: ,,, | Performed by: CLINICAL MEDICAL LABORATORY

## 2023-11-27 RX ORDER — METOPROLOL SUCCINATE 25 MG/1
25 TABLET, EXTENDED RELEASE ORAL DAILY
Qty: 90 TABLET | Refills: 1 | Status: SHIPPED | OUTPATIENT
Start: 2023-11-27

## 2023-11-27 RX ORDER — HYDRALAZINE HYDROCHLORIDE 100 MG/1
100 TABLET, FILM COATED ORAL 2 TIMES DAILY
Qty: 180 TABLET | Refills: 1 | Status: SHIPPED | OUTPATIENT
Start: 2023-11-27

## 2023-11-27 RX ORDER — LOSARTAN POTASSIUM AND HYDROCHLOROTHIAZIDE 25; 100 MG/1; MG/1
1 TABLET ORAL DAILY
Qty: 90 TABLET | Refills: 1 | Status: SHIPPED | OUTPATIENT
Start: 2023-11-27

## 2023-11-27 RX ORDER — ROSUVASTATIN CALCIUM 5 MG/1
5 TABLET, COATED ORAL DAILY
Qty: 90 TABLET | Refills: 1 | Status: SHIPPED | OUTPATIENT
Start: 2023-11-27

## 2023-11-27 NOTE — ASSESSMENT & PLAN NOTE
Lab Results   Component Value Date    CHOL 205 (H) 07/06/2023    CHOL 207 (H) 03/14/2023    CHOL 229 (H) 12/12/2022     Lab Results   Component Value Date    HDL 52 07/06/2023    HDL 56 03/14/2023    HDL 63 (H) 12/12/2022     Lab Results   Component Value Date    LDLCALC 106 07/06/2023    LDLCALC 115 03/14/2023    LDLCALC 137 12/12/2022     Lab Results   Component Value Date    TRIG 237 (H) 07/06/2023    TRIG 178 (H) 03/14/2023    TRIG 145 12/12/2022       Lab Results   Component Value Date    CHOLHDL 3.9 07/06/2023    CHOLHDL 3.7 03/14/2023    CHOLHDL 3.6 12/12/2022    The current medical regimen is effective;  continue present plan and medications.

## 2023-11-27 NOTE — PROGRESS NOTES
BRUNO Cohn   [facility] [address] [place] [address]  [phone]      PATIENT NAME: Joey Hughes  : 1955  DATE: 23  MRN: [id number]      Billing Provider: BRUNO Cohn  Level of Service:   Patient PCP Information       Provider PCP Type    BRUNO Cohn General            Reason for Visit / Chief Complaint: Hyperlipidemia, Hypertension, and Follow-up       Update PCP  Update Chief Complaint         History of Present Illness / Problem Focused Workflow     Joey Hughes presents to the clinic with Hyperlipidemia, Hypertension, and Follow-up     Pt presents for routine follow up with lab and med refills. Overall doing well.      BP appears  controlled today. Home meds reviewed  The current medical regimen is effective;  continue present plan and medications. Recommended patient to check home readings to monitor and see me for followup as scheduled or sooner as needed.   Discussed sodium restriction, maintaining ideal body weight and regular exercise program as physiologic means to continue to achieve blood pressure control in addition to medication compliance.  Patient was educated that both decongestant and anti-inflammatory medication may raise blood pressure.  Advised to monitor BP at home. Advised on optimal BP readings - SBP < 130 & DBP < 80. Advised to call office for any persistent BP elevation and may have to prescribe or adjust BP med(s).  Recommended DASH diet, stay well hydrated with water daily, eliminate or decrease caffeinated and high calorie drinks, increase physical activity, and lose weight if BMI > 25.0.    Discussed need for low fat/low cholesterol diet, regular exercise, and weight control.   Cardiovascular risk and specific lipid/LDL goals reviewed.        Review of Systems     Review of Systems   Constitutional:  Negative for fatigue and fever.    HENT:  Negative for nasal congestion and sore throat.    Eyes:  Negative for visual disturbance.   Respiratory:  Negative for chest tightness and shortness of breath.    Cardiovascular:  Negative for chest pain and leg swelling.   Gastrointestinal:  Negative for abdominal pain and change in bowel habit.   Endocrine: Negative for polydipsia, polyphagia and polyuria.   Genitourinary:  Negative for dysuria and hematuria.   Musculoskeletal:  Negative for back pain and leg pain.   Integumentary:  Negative for rash.   Neurological:  Negative for dizziness, syncope, weakness and light-headedness.        Medical / Social / Family History     Past Medical History:   Diagnosis Date    High cholesterol     Hypertension        History reviewed. No pertinent surgical history.    Social History  Mr. Hughes  reports that he has never smoked. He has never used smokeless tobacco. He reports current alcohol use of about 2.0 standard drinks of alcohol per week. He reports that he does not use drugs.    Family History  Mr. Hughes's family history includes Cancer in his mother; Diabetes in his brother and brother.    Medications and Allergies     Medications  Outpatient Medications Marked as Taking for the 11/27/23 encounter (Office Visit) with Debbie Smith ACNP   Medication Sig Dispense Refill    mv, min #36-iron,carbonyl-FA (GERITOL COMPLETE) 16 mg iron- 0.38 mg Tab Take 1 tablet by mouth once daily.      [DISCONTINUED] hydrALAZINE (APRESOLINE) 100 MG tablet Take 1 tablet (100 mg total) by mouth 2 (two) times daily. 180 tablet 1    [DISCONTINUED] losartan-hydrochlorothiazide 100-25 mg (HYZAAR) 100-25 mg per tablet Take 1 tablet by mouth once daily. 90 tablet 1    [DISCONTINUED] metoprolol succinate (TOPROL-XL) 25 MG 24 hr tablet Take 1 tablet (25 mg total) by mouth once daily. 90 tablet 1    [DISCONTINUED] rosuvastatin (CRESTOR) 5 MG tablet Take 1 tablet (5 mg total) by mouth once daily. 90 tablet 1        Allergies  Review of patient's allergies indicates:  No Known Allergies    Physical Examination     Vitals:    11/27/23 1028   BP: (!) 148/80   Pulse:    Resp:    Temp:      Physical Exam  Eyes:      Pupils: Pupils are equal, round, and reactive to light.   Cardiovascular:      Rate and Rhythm: Normal rate and regular rhythm.      Heart sounds: Normal heart sounds. No murmur heard.  Pulmonary:      Breath sounds: Normal breath sounds. No wheezing, rhonchi or rales.   Abdominal:      General: Bowel sounds are normal.   Musculoskeletal:         General: No swelling.      Cervical back: Normal range of motion and neck supple.   Skin:     General: Skin is warm and dry.   Neurological:      Mental Status: He is alert and oriented to person, place, and time.          Lab Results   Component Value Date    WBC 4.70 03/14/2023    HGB 13.6 03/14/2023    HCT 42.1 03/14/2023    MCV 88.6 03/14/2023     03/14/2023        Sodium   Date Value Ref Range Status   07/06/2023 139 136 - 145 mmol/L Final     Potassium   Date Value Ref Range Status   07/06/2023 4.0 3.5 - 5.1 mmol/L Final     Chloride   Date Value Ref Range Status   07/06/2023 106 98 - 107 mmol/L Final     CO2   Date Value Ref Range Status   07/06/2023 28 21 - 32 mmol/L Final     Glucose   Date Value Ref Range Status   07/06/2023 127 (H) 74 - 106 mg/dL Final     BUN   Date Value Ref Range Status   07/06/2023 15 7 - 18 mg/dL Final     Creatinine   Date Value Ref Range Status   07/06/2023 1.23 0.70 - 1.30 mg/dL Final     Calcium   Date Value Ref Range Status   07/06/2023 9.2 8.5 - 10.1 mg/dL Final     Total Protein   Date Value Ref Range Status   07/06/2023 7.3 6.4 - 8.2 g/dL Final     Albumin   Date Value Ref Range Status   07/06/2023 3.9 3.5 - 5.0 g/dL Final     Bilirubin, Total   Date Value Ref Range Status   07/06/2023 0.5 >0.0 - 1.2 mg/dL Final     Alk Phos   Date Value Ref Range Status   07/06/2023 50 45 - 115 U/L Final     AST   Date Value Ref Range  "Status   07/06/2023 29 15 - 37 U/L Final     ALT   Date Value Ref Range Status   07/06/2023 40 16 - 61 U/L Final     Anion Gap   Date Value Ref Range Status   07/06/2023 9 7 - 16 mmol/L Final     eGFR   Date Value Ref Range Status   07/06/2023 65 >=60 mL/min/1.73m2 Final      Lab Results   Component Value Date    HGBA1C 6.0 07/06/2023      Lab Results   Component Value Date    CHOL 205 (H) 07/06/2023    CHOL 207 (H) 03/14/2023    CHOL 229 (H) 12/12/2022     Lab Results   Component Value Date    HDL 52 07/06/2023    HDL 56 03/14/2023    HDL 63 (H) 12/12/2022     Lab Results   Component Value Date    LDLCALC 106 07/06/2023    LDLCALC 115 03/14/2023    LDLCALC 137 12/12/2022     No results found for: "DLDL"  Lab Results   Component Value Date    TRIG 237 (H) 07/06/2023    TRIG 178 (H) 03/14/2023    TRIG 145 12/12/2022     Lab Results   Component Value Date    CHOLHDL 3.9 07/06/2023    CHOLHDL 3.7 03/14/2023    CHOLHDL 3.6 12/12/2022      Lab Results   Component Value Date    TSH 1.100 02/16/2022        Assessment and Plan (including Health Maintenance)      Problem List  Smart Sets  Document Outside HM   :    Plan:     1. Hypertension, unspecified type  Assessment & Plan:  BP Readings from Last 3 Encounters:   11/27/23 (!) 148/80   09/21/23 (!) 158/87   08/17/23 (!) 161/87    The current medical regimen is effective;  continue present plan and medications.     Orders:  -     Comprehensive Metabolic Panel; Future; Expected date: 11/27/2023  -     losartan-hydrochlorothiazide 100-25 mg (HYZAAR) 100-25 mg per tablet; Take 1 tablet by mouth once daily.  Dispense: 90 tablet; Refill: 1  -     metoprolol succinate (TOPROL-XL) 25 MG 24 hr tablet; Take 1 tablet (25 mg total) by mouth once daily.  Dispense: 90 tablet; Refill: 1    2. Hyperlipidemia, unspecified hyperlipidemia type  Assessment & Plan:  Lab Results   Component Value Date    CHOL 205 (H) 07/06/2023    CHOL 207 (H) 03/14/2023    CHOL 229 (H) 12/12/2022     Lab " Results   Component Value Date    HDL 52 07/06/2023    HDL 56 03/14/2023    HDL 63 (H) 12/12/2022     Lab Results   Component Value Date    LDLCALC 106 07/06/2023    LDLCALC 115 03/14/2023    LDLCALC 137 12/12/2022     Lab Results   Component Value Date    TRIG 237 (H) 07/06/2023    TRIG 178 (H) 03/14/2023    TRIG 145 12/12/2022       Lab Results   Component Value Date    CHOLHDL 3.9 07/06/2023    CHOLHDL 3.7 03/14/2023    CHOLHDL 3.6 12/12/2022    The current medical regimen is effective;  continue present plan and medications.      Orders:  -     Lipid Panel; Future; Expected date: 11/27/2023  -     rosuvastatin (CRESTOR) 5 MG tablet; Take 1 tablet (5 mg total) by mouth once daily.  Dispense: 90 tablet; Refill: 1    3. Pre-diabetes  -     Hemoglobin A1C; Future; Expected date: 11/27/2023    Other orders  -     hydrALAZINE (APRESOLINE) 100 MG tablet; Take 1 tablet (100 mg total) by mouth 2 (two) times daily.  Dispense: 180 tablet; Refill: 1         There are no Patient Instructions on file for this visit.     There are no preventive care reminders to display for this patient.      Health Maintenance Topics with due status: Not Due       Topic Last Completion Date    Colorectal Cancer Screening 02/22/2023    PROSTATE-SPECIFIC ANTIGEN 03/14/2023    Diabetes Urine Screening 07/06/2023    Lipid Panel 07/06/2023    Hemoglobin A1c 07/06/2023       Future Appointments   Date Time Provider Department Center   2/27/2024  1:00 PM Debbie Smith ACNP [facility]   9/26/2024  1:00 PM [facility]            Signature:  BRUNO Cohn [facility] [address] [place] [address]  [phone]    Date of encounter: 11/27/23

## 2023-11-27 NOTE — ASSESSMENT & PLAN NOTE
BP Readings from Last 3 Encounters:   11/27/23 (!) 148/80   09/21/23 (!) 158/87   08/17/23 (!) 161/87    The current medical regimen is effective;  continue present plan and medications.

## 2024-02-27 ENCOUNTER — OFFICE VISIT (OUTPATIENT)
Dept: FAMILY MEDICINE | Facility: CLINIC | Age: 69
End: 2024-02-27
Payer: COMMERCIAL

## 2024-02-27 VITALS
HEART RATE: 74 BPM | WEIGHT: 223 LBS | SYSTOLIC BLOOD PRESSURE: 179 MMHG | BODY MASS INDEX: 33.03 KG/M2 | DIASTOLIC BLOOD PRESSURE: 82 MMHG | RESPIRATION RATE: 16 BRPM | OXYGEN SATURATION: 98 % | TEMPERATURE: 98 F | HEIGHT: 69 IN

## 2024-02-27 DIAGNOSIS — E78.5 HYPERLIPIDEMIA, UNSPECIFIED HYPERLIPIDEMIA TYPE: ICD-10-CM

## 2024-02-27 DIAGNOSIS — I10 HYPERTENSION, UNSPECIFIED TYPE: Primary | ICD-10-CM

## 2024-02-27 DIAGNOSIS — R73.03 PRE-DIABETES: ICD-10-CM

## 2024-02-27 DIAGNOSIS — Z12.5 SCREENING FOR PROSTATE CANCER: ICD-10-CM

## 2024-02-27 LAB
ALBUMIN SERPL BCP-MCNC: 4.1 G/DL (ref 3.5–5)
ALBUMIN/GLOB SERPL: 1.2 {RATIO}
ALP SERPL-CCNC: 60 U/L (ref 45–115)
ALT SERPL W P-5'-P-CCNC: 51 U/L (ref 16–61)
ANION GAP SERPL CALCULATED.3IONS-SCNC: 10 MMOL/L (ref 7–16)
AST SERPL W P-5'-P-CCNC: 36 U/L (ref 15–37)
BASOPHILS # BLD AUTO: 0.06 K/UL (ref 0–0.2)
BASOPHILS NFR BLD AUTO: 1.2 % (ref 0–1)
BILIRUB SERPL-MCNC: 0.6 MG/DL (ref ?–1.2)
BUN SERPL-MCNC: 14 MG/DL (ref 7–18)
BUN/CREAT SERPL: 13 (ref 6–20)
CALCIUM SERPL-MCNC: 9.4 MG/DL (ref 8.5–10.1)
CHLORIDE SERPL-SCNC: 103 MMOL/L (ref 98–107)
CHOLEST SERPL-MCNC: 221 MG/DL (ref 0–200)
CHOLEST/HDLC SERPL: 3.3 {RATIO}
CO2 SERPL-SCNC: 28 MMOL/L (ref 21–32)
CREAT SERPL-MCNC: 1.08 MG/DL (ref 0.7–1.3)
DIFFERENTIAL METHOD BLD: ABNORMAL
EGFR (NO RACE VARIABLE) (RUSH/TITUS): 76 ML/MIN/1.73M2
EOSINOPHIL # BLD AUTO: 0.15 K/UL (ref 0–0.5)
EOSINOPHIL NFR BLD AUTO: 2.9 % (ref 1–4)
ERYTHROCYTE [DISTWIDTH] IN BLOOD BY AUTOMATED COUNT: 14 % (ref 11.5–14.5)
GLOBULIN SER-MCNC: 3.4 G/DL (ref 2–4)
GLUCOSE SERPL-MCNC: 117 MG/DL (ref 74–106)
HCT VFR BLD AUTO: 40.3 % (ref 40–54)
HDLC SERPL-MCNC: 68 MG/DL (ref 40–60)
HGB BLD-MCNC: 13.6 G/DL (ref 13.5–18)
IMM GRANULOCYTES # BLD AUTO: 0.01 K/UL (ref 0–0.04)
IMM GRANULOCYTES NFR BLD: 0.2 % (ref 0–0.4)
LDLC SERPL CALC-MCNC: 124 MG/DL
LDLC/HDLC SERPL: 1.8 {RATIO}
LYMPHOCYTES # BLD AUTO: 2.01 K/UL (ref 1–4.8)
LYMPHOCYTES NFR BLD AUTO: 38.6 % (ref 27–41)
MCH RBC QN AUTO: 29.1 PG (ref 27–31)
MCHC RBC AUTO-ENTMCNC: 33.7 G/DL (ref 32–36)
MCV RBC AUTO: 86.3 FL (ref 80–96)
MONOCYTES # BLD AUTO: 0.54 K/UL (ref 0–0.8)
MONOCYTES NFR BLD AUTO: 10.4 % (ref 2–6)
MPC BLD CALC-MCNC: 10.4 FL (ref 9.4–12.4)
NEUTROPHILS # BLD AUTO: 2.44 K/UL (ref 1.8–7.7)
NEUTROPHILS NFR BLD AUTO: 46.7 % (ref 53–65)
NONHDLC SERPL-MCNC: 153 MG/DL
NRBC # BLD AUTO: 0 X10E3/UL
NRBC, AUTO (.00): 0 %
PLATELET # BLD AUTO: 242 K/UL (ref 150–400)
POTASSIUM SERPL-SCNC: 4.1 MMOL/L (ref 3.5–5.1)
PROT SERPL-MCNC: 7.5 G/DL (ref 6.4–8.2)
PSA SERPL-MCNC: 0.29 NG/ML
RBC # BLD AUTO: 4.67 M/UL (ref 4.6–6.2)
SODIUM SERPL-SCNC: 137 MMOL/L (ref 136–145)
TRIGL SERPL-MCNC: 143 MG/DL (ref 35–150)
VLDLC SERPL-MCNC: 29 MG/DL
WBC # BLD AUTO: 5.21 K/UL (ref 4.5–11)

## 2024-02-27 PROCEDURE — G0103 PSA SCREENING: HCPCS | Mod: GZ,,, | Performed by: CLINICAL MEDICAL LABORATORY

## 2024-02-27 PROCEDURE — 3008F BODY MASS INDEX DOCD: CPT | Mod: ,,, | Performed by: NURSE PRACTITIONER

## 2024-02-27 PROCEDURE — 1101F PT FALLS ASSESS-DOCD LE1/YR: CPT | Mod: ,,, | Performed by: NURSE PRACTITIONER

## 2024-02-27 PROCEDURE — 80061 LIPID PANEL: CPT | Mod: ,,, | Performed by: CLINICAL MEDICAL LABORATORY

## 2024-02-27 PROCEDURE — 1160F RVW MEDS BY RX/DR IN RCRD: CPT | Mod: ,,, | Performed by: NURSE PRACTITIONER

## 2024-02-27 PROCEDURE — 3288F FALL RISK ASSESSMENT DOCD: CPT | Mod: ,,, | Performed by: NURSE PRACTITIONER

## 2024-02-27 PROCEDURE — 99213 OFFICE O/P EST LOW 20 MIN: CPT | Mod: ,,, | Performed by: NURSE PRACTITIONER

## 2024-02-27 PROCEDURE — 1159F MED LIST DOCD IN RCRD: CPT | Mod: ,,, | Performed by: NURSE PRACTITIONER

## 2024-02-27 PROCEDURE — 85025 COMPLETE CBC W/AUTO DIFF WBC: CPT | Mod: ,,, | Performed by: CLINICAL MEDICAL LABORATORY

## 2024-02-27 PROCEDURE — 83036 HEMOGLOBIN GLYCOSYLATED A1C: CPT | Mod: ,,, | Performed by: CLINICAL MEDICAL LABORATORY

## 2024-02-27 PROCEDURE — 3079F DIAST BP 80-89 MM HG: CPT | Mod: ,,, | Performed by: NURSE PRACTITIONER

## 2024-02-27 PROCEDURE — 3077F SYST BP >= 140 MM HG: CPT | Mod: ,,, | Performed by: NURSE PRACTITIONER

## 2024-02-27 PROCEDURE — 80053 COMPREHEN METABOLIC PANEL: CPT | Mod: ,,, | Performed by: CLINICAL MEDICAL LABORATORY

## 2024-02-27 NOTE — ASSESSMENT & PLAN NOTE
Lab Results   Component Value Date    CHOL 208 (H) 11/27/2023    CHOL 205 (H) 07/06/2023    CHOL 207 (H) 03/14/2023     Lab Results   Component Value Date    HDL 61 (H) 11/27/2023    HDL 52 07/06/2023    HDL 56 03/14/2023     Lab Results   Component Value Date    LDLCALC 120 11/27/2023    LDLCALC 106 07/06/2023    LDLCALC 115 03/14/2023     Lab Results   Component Value Date    TRIG 133 11/27/2023    TRIG 237 (H) 07/06/2023    TRIG 178 (H) 03/14/2023       Lab Results   Component Value Date    CHOLHDL 3.4 11/27/2023    CHOLHDL 3.9 07/06/2023    CHOLHDL 3.7 03/14/2023    The current medical regimen is effective;  continue present plan and medications.

## 2024-02-27 NOTE — ASSESSMENT & PLAN NOTE
BP Readings from Last 3 Encounters:   02/27/24 (!) 179/82   11/27/23 (!) 148/80   09/21/23 (!) 158/87    The current medical regimen is effective;  continue present plan and medications.  Goal less 130/80  Edu given

## 2024-02-27 NOTE — PROGRESS NOTES
BRUNO Cohn   [facility] [address] [place] [address]  [phone]      PATIENT NAME: Joey Hughes  : 1955  DATE: 24  MRN: [id number]      Billing Provider: BRUNO Cohn  Level of Service:   Patient PCP Information       Provider PCP Type    BRUNO Cohn General            Reason for Visit / Chief Complaint: Hyperlipidemia, Hypertension, and Follow-up (3 mos)       Update PCP  Update Chief Complaint         History of Present Illness / Problem Focused Workflow     Joey Hughes presents to the clinic with Hyperlipidemia, Hypertension, and Follow-up (3 mos)     Pt presents for routine follow up with lab and med refills. Overall doing well.      BP appears poorly controlled today. Home meds reviewed   continue present plan and medications. Recommended patient to check home readings to monitor and see me for followup as scheduled or sooner as needed.   Discussed sodium restriction, maintaining ideal body weight and regular exercise program as physiologic means to continue to achieve blood pressure control in addition to medication compliance.  Patient was educated that both decongestant and anti-inflammatory medication may raise blood pressure.  Advised to monitor BP at home. Advised on optimal BP readings - SBP < 130 & DBP < 80. Advised to call office for any persistent BP elevation and may have to prescribe or adjust BP med(s).  Recommended DASH diet, stay well hydrated with water daily, eliminate or decrease caffeinated and high calorie drinks, increase physical activity, and lose weight if BMI > 25.0.    Discussed need for low fat/low cholesterol diet, regular exercise, and weight control.   Cardiovascular risk and specific lipid/LDL goals reviewed.     Had long discussion patient about importance of low-salt low-sodium diet patient verbalized understanding and agreement  patient education provided we will follow up in a couple of lines and make adjustments to medications if needed.  Encouraged patient was take blood pressure medications 30 minutes prior to next appointment make sure that he was staying hydrated with lots of fluids.        Review of Systems     Review of Systems   Constitutional:  Negative for fatigue and fever.   HENT:  Negative for nasal congestion and sore throat.    Eyes:  Negative for visual disturbance.   Respiratory:  Negative for chest tightness and shortness of breath.    Cardiovascular:  Negative for chest pain and leg swelling.   Gastrointestinal:  Negative for abdominal pain and change in bowel habit.   Endocrine: Negative for polydipsia, polyphagia and polyuria.   Genitourinary:  Negative for dysuria and hematuria.   Musculoskeletal:  Negative for back pain and leg pain.   Integumentary:  Negative for rash.   Neurological:  Negative for dizziness, syncope, weakness and light-headedness.        Medical / Social / Family History     Past Medical History:   Diagnosis Date    High cholesterol     Hypertension        No past surgical history on file.    Social History  Mr. Hughes  reports that he has never smoked. He has never used smokeless tobacco. He reports current alcohol use of about 2.0 standard drinks of alcohol per week. He reports that he does not use drugs.    Family History  Mr. Hughes's family history includes Cancer in his mother; Diabetes in his brother and brother.    Medications and Allergies     Medications  Outpatient Medications Marked as Taking for the 2/27/24 encounter (Office Visit) with Debbie Smith ACNP   Medication Sig Dispense Refill    hydrALAZINE (APRESOLINE) 100 MG tablet Take 1 tablet (100 mg total) by mouth 2 (two) times daily. 180 tablet 1    losartan-hydrochlorothiazide 100-25 mg (HYZAAR) 100-25 mg per tablet Take 1 tablet by mouth once daily. 90 tablet 1    metoprolol succinate (TOPROL-XL) 25 MG 24 hr tablet Take  1 tablet (25 mg total) by mouth once daily. 90 tablet 1    mv, min #36-iron,carbonyl-FA (GERITOL COMPLETE) 16 mg iron- 0.38 mg Tab Take 1 tablet by mouth once daily.      rosuvastatin (CRESTOR) 5 MG tablet Take 1 tablet (5 mg total) by mouth once daily. 90 tablet 1       Allergies  Review of patient's allergies indicates:  No Known Allergies    Physical Examination     Vitals:    02/27/24 1308   BP: (!) 179/82   Pulse: 74   Resp: 16   Temp: 98.1 °F (36.7 °C)     Physical Exam  Eyes:      Pupils: Pupils are equal, round, and reactive to light.   Cardiovascular:      Rate and Rhythm: Normal rate and regular rhythm.      Heart sounds: Normal heart sounds. No murmur heard.  Pulmonary:      Breath sounds: Normal breath sounds. No wheezing, rhonchi or rales.   Abdominal:      General: Bowel sounds are normal.   Musculoskeletal:         General: No swelling.      Cervical back: Normal range of motion and neck supple.   Skin:     General: Skin is warm and dry.   Neurological:      Mental Status: He is alert and oriented to person, place, and time.          Lab Results   Component Value Date    WBC 4.70 03/14/2023    HGB 13.6 03/14/2023    HCT 42.1 03/14/2023    MCV 88.6 03/14/2023     03/14/2023        Sodium   Date Value Ref Range Status   11/27/2023 138 136 - 145 mmol/L Final     Potassium   Date Value Ref Range Status   11/27/2023 3.6 3.5 - 5.1 mmol/L Final     Chloride   Date Value Ref Range Status   11/27/2023 106 98 - 107 mmol/L Final     CO2   Date Value Ref Range Status   11/27/2023 26 21 - 32 mmol/L Final     Glucose   Date Value Ref Range Status   11/27/2023 128 (H) 74 - 106 mg/dL Final     BUN   Date Value Ref Range Status   11/27/2023 18 7 - 18 mg/dL Final     Creatinine   Date Value Ref Range Status   11/27/2023 1.03 0.70 - 1.30 mg/dL Final     Calcium   Date Value Ref Range Status   11/27/2023 9.9 8.5 - 10.1 mg/dL Final     Total Protein   Date Value Ref Range Status   11/27/2023 7.4 6.4 - 8.2 g/dL  "Final     Albumin   Date Value Ref Range Status   11/27/2023 4.0 3.5 - 5.0 g/dL Final     Bilirubin, Total   Date Value Ref Range Status   11/27/2023 0.6 >0.0 - 1.2 mg/dL Final     Alk Phos   Date Value Ref Range Status   11/27/2023 56 45 - 115 U/L Final     AST   Date Value Ref Range Status   11/27/2023 30 15 - 37 U/L Final     ALT   Date Value Ref Range Status   11/27/2023 43 16 - 61 U/L Final     Anion Gap   Date Value Ref Range Status   11/27/2023 10 7 - 16 mmol/L Final     eGFR   Date Value Ref Range Status   11/27/2023 80 >=60 mL/min/1.73m2 Final      Lab Results   Component Value Date    HGBA1C 5.9 11/27/2023      Lab Results   Component Value Date    CHOL 208 (H) 11/27/2023    CHOL 205 (H) 07/06/2023    CHOL 207 (H) 03/14/2023     Lab Results   Component Value Date    HDL 61 (H) 11/27/2023    HDL 52 07/06/2023    HDL 56 03/14/2023     Lab Results   Component Value Date    LDLCALC 120 11/27/2023    LDLCALC 106 07/06/2023    LDLCALC 115 03/14/2023     No results found for: "DLDL"  Lab Results   Component Value Date    TRIG 133 11/27/2023    TRIG 237 (H) 07/06/2023    TRIG 178 (H) 03/14/2023     Lab Results   Component Value Date    CHOLHDL 3.4 11/27/2023    CHOLHDL 3.9 07/06/2023    CHOLHDL 3.7 03/14/2023      Lab Results   Component Value Date    TSH 1.100 02/16/2022        Assessment and Plan (including Health Maintenance)      Problem List  Smart Sets  Document Outside HM   :    Plan:     1. Hypertension, unspecified type  Assessment & Plan:  BP Readings from Last 3 Encounters:   02/27/24 (!) 179/82   11/27/23 (!) 148/80   09/21/23 (!) 158/87    The current medical regimen is effective;  continue present plan and medications.  Goal less 130/80  Edu given    Orders:  -     CBC Auto Differential; Future; Expected date: 02/27/2024  -     Comprehensive Metabolic Panel; Future; Expected date: 02/27/2024    2. Hyperlipidemia, unspecified hyperlipidemia type  Assessment & Plan:  Lab Results   Component Value Date "    CHOL 208 (H) 11/27/2023    CHOL 205 (H) 07/06/2023    CHOL 207 (H) 03/14/2023     Lab Results   Component Value Date    HDL 61 (H) 11/27/2023    HDL 52 07/06/2023    HDL 56 03/14/2023     Lab Results   Component Value Date    LDLCALC 120 11/27/2023    LDLCALC 106 07/06/2023    LDLCALC 115 03/14/2023     Lab Results   Component Value Date    TRIG 133 11/27/2023    TRIG 237 (H) 07/06/2023    TRIG 178 (H) 03/14/2023       Lab Results   Component Value Date    CHOLHDL 3.4 11/27/2023    CHOLHDL 3.9 07/06/2023    CHOLHDL 3.7 03/14/2023    The current medical regimen is effective;  continue present plan and medications.      Orders:  -     Lipid Panel; Future; Expected date: 02/27/2024    3. Pre-diabetes  -     Hemoglobin A1C; Future; Expected date: 02/27/2024    4. Screening for prostate cancer  -     PSA, Screening; Future; Expected date: 02/27/2024         Patient Instructions   Htn edu  Low salt diet      There are no preventive care reminders to display for this patient.      Health Maintenance Topics with due status: Not Due       Topic Last Completion Date    Colorectal Cancer Screening 02/22/2023    PROSTATE-SPECIFIC ANTIGEN 03/14/2023    Diabetes Urine Screening 07/06/2023    Lipid Panel 11/27/2023    Hemoglobin A1c 11/27/2023       Future Appointments   Date Time Provider Department Center   4/25/2024  1:20 PM Debbie Smith ACNP [facility]   9/26/2024  1:00 PM AWV NURSE C [facility]            Signature:  BRUNO Cohn [facility] [address] [place] [address]  [phone]    Date of encounter: 2/27/24

## 2024-02-28 LAB
EST. AVERAGE GLUCOSE BLD GHB EST-MCNC: 131 MG/DL
HBA1C MFR BLD HPLC: 6.2 % (ref 4.5–6.6)

## 2024-04-25 ENCOUNTER — OFFICE VISIT (OUTPATIENT)
Dept: FAMILY MEDICINE | Facility: CLINIC | Age: 69
End: 2024-04-25
Payer: COMMERCIAL

## 2024-04-25 VITALS
OXYGEN SATURATION: 97 % | HEART RATE: 82 BPM | RESPIRATION RATE: 16 BRPM | TEMPERATURE: 98 F | DIASTOLIC BLOOD PRESSURE: 83 MMHG | HEIGHT: 69 IN | WEIGHT: 223 LBS | SYSTOLIC BLOOD PRESSURE: 145 MMHG | BODY MASS INDEX: 33.03 KG/M2

## 2024-04-25 DIAGNOSIS — I10 PRIMARY HYPERTENSION: Primary | ICD-10-CM

## 2024-04-25 PROCEDURE — 3044F HG A1C LEVEL LT 7.0%: CPT | Mod: ,,, | Performed by: NURSE PRACTITIONER

## 2024-04-25 PROCEDURE — 1160F RVW MEDS BY RX/DR IN RCRD: CPT | Mod: ,,, | Performed by: NURSE PRACTITIONER

## 2024-04-25 PROCEDURE — 3077F SYST BP >= 140 MM HG: CPT | Mod: ,,, | Performed by: NURSE PRACTITIONER

## 2024-04-25 PROCEDURE — 3288F FALL RISK ASSESSMENT DOCD: CPT | Mod: ,,, | Performed by: NURSE PRACTITIONER

## 2024-04-25 PROCEDURE — 1101F PT FALLS ASSESS-DOCD LE1/YR: CPT | Mod: ,,, | Performed by: NURSE PRACTITIONER

## 2024-04-25 PROCEDURE — 3079F DIAST BP 80-89 MM HG: CPT | Mod: ,,, | Performed by: NURSE PRACTITIONER

## 2024-04-25 PROCEDURE — 1159F MED LIST DOCD IN RCRD: CPT | Mod: ,,, | Performed by: NURSE PRACTITIONER

## 2024-04-25 PROCEDURE — 99213 OFFICE O/P EST LOW 20 MIN: CPT | Mod: ,,, | Performed by: NURSE PRACTITIONER

## 2024-04-25 PROCEDURE — 3008F BODY MASS INDEX DOCD: CPT | Mod: ,,, | Performed by: NURSE PRACTITIONER

## 2024-04-25 RX ORDER — VALSARTAN AND HYDROCHLOROTHIAZIDE 320; 25 MG/1; MG/1
1 TABLET, FILM COATED ORAL DAILY
Qty: 90 TABLET | Refills: 1 | Status: SHIPPED | OUTPATIENT
Start: 2024-04-25 | End: 2025-04-25

## 2024-04-25 NOTE — ASSESSMENT & PLAN NOTE
BP Readings from Last 3 Encounters:   04/25/24 (!) 145/83   02/27/24 (!) 179/82   11/27/23 (!) 148/80    D/c losartan  Change to diovan/hctz 320/25 daily  Goal bp less 130/80  Encouraged home monitoring and bring log for review

## 2024-04-25 NOTE — PROGRESS NOTES
BRUNO Cohn   [facility] TERI SAUL [facility] [address] [place] [address]  [phone]      PATIENT NAME: Joey Hughes  : 1955  DATE: 24  MRN: [id number]      Billing Provider: BRUNO Cohn  Level of Service:   Patient PCP Information       Provider PCP Type    BRUNO Cohn General            Reason for Visit / Chief Complaint: Follow-up and Hypertension (2mos)       Update PCP  Update Chief Complaint         History of Present Illness / Problem Focused Workflow     Joey Hughes presents to the clinic with Follow-up and Hypertension (2mos)     Pt presents for routine follow up with lab and med refills. Overall doing well.      BP appears to be improving  but not at goal today. Home meds reviewed,  we will discontinue the losartan.  Start on Diovan 320/25mg 1 p.o. daily.  Advised to monitor BP at home. Advised on optimal BP readings - SBP < 130 & DBP < 80. Advised to call office for any persistent BP elevation and may have to prescribe or adjust BP med(s).  Recommended DASH diet, stay well hydrated with water daily, eliminate or decrease caffeinated and high calorie drinks, increase physical activity, and lose weight if BMI > 25.0. Written patient education information provided to patient with goals and recommendations to assist with BP management.      Previous labs reviewed and discussed with the patient        Review of Systems     Review of Systems   Constitutional:  Negative for fatigue and fever.   HENT:  Negative for nasal congestion and sore throat.    Eyes:  Negative for visual disturbance.   Respiratory:  Negative for chest tightness and shortness of breath.    Cardiovascular:  Negative for chest pain and leg swelling.   Gastrointestinal:  Negative for abdominal pain and change in bowel habit.   Endocrine: Negative for polydipsia, polyphagia and polyuria.   Genitourinary:  Negative  for dysuria and hematuria.   Musculoskeletal:  Negative for back pain and leg pain.   Integumentary:  Negative for rash.   Neurological:  Negative for dizziness, syncope, weakness and light-headedness.        Medical / Social / Family History     Past Medical History:   Diagnosis Date    High cholesterol     Hypertension        No past surgical history on file.    Social History  Mr. Hughes  reports that he has never smoked. He has never used smokeless tobacco. He reports current alcohol use of about 2.0 standard drinks of alcohol per week. He reports that he does not use drugs.    Family History  Mr. Hughes's family history includes Cancer in his mother; Diabetes in his brother and brother.    Medications and Allergies     Medications  Current Outpatient Medications   Medication Sig Dispense Refill    hydrALAZINE (APRESOLINE) 100 MG tablet Take 1 tablet (100 mg total) by mouth 2 (two) times daily. 180 tablet 1    metoprolol succinate (TOPROL-XL) 25 MG 24 hr tablet Take 1 tablet (25 mg total) by mouth once daily. 90 tablet 1    mv, min #36-iron,carbonyl-FA (GERITOL COMPLETE) 16 mg iron- 0.38 mg Tab Take 1 tablet by mouth once daily.      rosuvastatin (CRESTOR) 5 MG tablet Take 1 tablet (5 mg total) by mouth once daily. 90 tablet 1    valsartan-hydrochlorothiazide (DIOVAN-HCT) 320-25 mg per tablet Take 1 tablet by mouth once daily. 90 tablet 1     No current facility-administered medications for this visit.       Allergies  Review of patient's allergies indicates:  No Known Allergies    Physical Examination     Vitals:    04/25/24 1307   BP: (!) 145/83   Pulse:    Resp:    Temp:      Physical Exam  Eyes:      Pupils: Pupils are equal, round, and reactive to light.   Cardiovascular:      Rate and Rhythm: Normal rate and regular rhythm.      Heart sounds: Normal heart sounds. No murmur heard.  Pulmonary:      Breath sounds: Normal breath sounds. No wheezing, rhonchi or rales.   Abdominal:      General: Bowel sounds  are normal.      Palpations: Abdomen is soft.   Musculoskeletal:         General: No swelling.      Cervical back: Normal range of motion and neck supple.   Skin:     General: Skin is warm and dry.   Neurological:      Mental Status: He is alert and oriented to person, place, and time.          Lab Results   Component Value Date    WBC 5.21 02/27/2024    HGB 13.6 02/27/2024    HCT 40.3 02/27/2024    MCV 86.3 02/27/2024     02/27/2024        Sodium   Date Value Ref Range Status   02/27/2024 137 136 - 145 mmol/L Final     Potassium   Date Value Ref Range Status   02/27/2024 4.1 3.5 - 5.1 mmol/L Final     Chloride   Date Value Ref Range Status   02/27/2024 103 98 - 107 mmol/L Final     CO2   Date Value Ref Range Status   02/27/2024 28 21 - 32 mmol/L Final     Glucose   Date Value Ref Range Status   02/27/2024 117 (H) 74 - 106 mg/dL Final     BUN   Date Value Ref Range Status   02/27/2024 14 7 - 18 mg/dL Final     Creatinine   Date Value Ref Range Status   02/27/2024 1.08 0.70 - 1.30 mg/dL Final     Calcium   Date Value Ref Range Status   02/27/2024 9.4 8.5 - 10.1 mg/dL Final     Total Protein   Date Value Ref Range Status   02/27/2024 7.5 6.4 - 8.2 g/dL Final     Albumin   Date Value Ref Range Status   02/27/2024 4.1 3.5 - 5.0 g/dL Final     Bilirubin, Total   Date Value Ref Range Status   02/27/2024 0.6 >0.0 - 1.2 mg/dL Final     Alk Phos   Date Value Ref Range Status   02/27/2024 60 45 - 115 U/L Final     AST   Date Value Ref Range Status   02/27/2024 36 15 - 37 U/L Final     ALT   Date Value Ref Range Status   02/27/2024 51 16 - 61 U/L Final     Anion Gap   Date Value Ref Range Status   02/27/2024 10 7 - 16 mmol/L Final     eGFR   Date Value Ref Range Status   02/27/2024 76 >=60 mL/min/1.73m2 Final      Lab Results   Component Value Date    HGBA1C 6.2 02/27/2024      Lab Results   Component Value Date    CHOL 221 (H) 02/27/2024    CHOL 208 (H) 11/27/2023    CHOL 205 (H) 07/06/2023     Lab Results   Component  "Value Date    HDL 68 (H) 02/27/2024    HDL 61 (H) 11/27/2023    HDL 52 07/06/2023     Lab Results   Component Value Date    LDLCALC 124 02/27/2024    LDLCALC 120 11/27/2023    LDLCALC 106 07/06/2023     No results found for: "DLDL"  Lab Results   Component Value Date    TRIG 143 02/27/2024    TRIG 133 11/27/2023    TRIG 237 (H) 07/06/2023     Lab Results   Component Value Date    CHOLHDL 3.3 02/27/2024    CHOLHDL 3.4 11/27/2023    CHOLHDL 3.9 07/06/2023      Lab Results   Component Value Date    TSH 1.100 02/16/2022        Assessment and Plan (including Health Maintenance)      Problem List  Smart Sets  Document Outside HM   :    Plan:     1. Primary hypertension  Assessment & Plan:  BP Readings from Last 3 Encounters:   04/25/24 (!) 145/83   02/27/24 (!) 179/82   11/27/23 (!) 148/80    D/c losartan  Change to diovan/hctz 320/25 daily  Goal bp less 130/80  Encouraged home monitoring and bring log for review    Orders:  -     valsartan-hydrochlorothiazide (DIOVAN-HCT) 320-25 mg per tablet; Take 1 tablet by mouth once daily.  Dispense: 90 tablet; Refill: 1         There are no Patient Instructions on file for this visit.     There are no preventive care reminders to display for this patient.      Health Maintenance Topics with due status: Not Due       Topic Last Completion Date    Colorectal Cancer Screening 02/22/2023    Diabetes Urine Screening 07/06/2023    PROSTATE-SPECIFIC ANTIGEN 02/27/2024    Lipid Panel 02/27/2024    Hemoglobin A1c 02/27/2024       Future Appointments   Date Time Provider Department Center   7/25/2024  1:20 PM Debbie Smith ACNP [facility]   9/26/2024  1:00 PM AWV NURSE C [facility]            Signature:  BRUNO Cohn [facility] [address] [place] [address]  [phone]    Date of encounter: 4/25/24    "

## 2024-05-09 DIAGNOSIS — Z71.89 COMPLEX CARE COORDINATION: ICD-10-CM

## 2024-07-25 ENCOUNTER — OFFICE VISIT (OUTPATIENT)
Dept: FAMILY MEDICINE | Facility: CLINIC | Age: 69
End: 2024-07-25
Payer: COMMERCIAL

## 2024-07-25 VITALS
BODY MASS INDEX: 33.18 KG/M2 | RESPIRATION RATE: 18 BRPM | OXYGEN SATURATION: 99 % | WEIGHT: 224 LBS | HEART RATE: 89 BPM | TEMPERATURE: 98 F | HEIGHT: 69 IN | DIASTOLIC BLOOD PRESSURE: 76 MMHG | SYSTOLIC BLOOD PRESSURE: 143 MMHG

## 2024-07-25 DIAGNOSIS — I10 HYPERTENSION, UNSPECIFIED TYPE: ICD-10-CM

## 2024-07-25 DIAGNOSIS — E78.5 HYPERLIPIDEMIA, UNSPECIFIED HYPERLIPIDEMIA TYPE: ICD-10-CM

## 2024-07-25 DIAGNOSIS — I10 PRIMARY HYPERTENSION: Primary | ICD-10-CM

## 2024-07-25 DIAGNOSIS — R73.03 PRE-DIABETES: ICD-10-CM

## 2024-07-25 LAB
ALBUMIN SERPL BCP-MCNC: 4.3 G/DL (ref 3.5–5)
ALBUMIN/GLOB SERPL: 1.3 {RATIO}
ALP SERPL-CCNC: 59 U/L (ref 45–115)
ALT SERPL W P-5'-P-CCNC: 52 U/L (ref 16–61)
ANION GAP SERPL CALCULATED.3IONS-SCNC: 10 MMOL/L (ref 7–16)
AST SERPL W P-5'-P-CCNC: 29 U/L (ref 15–37)
BASOPHILS # BLD AUTO: 0.05 K/UL (ref 0–0.2)
BASOPHILS NFR BLD AUTO: 0.9 % (ref 0–1)
BILIRUB SERPL-MCNC: 0.4 MG/DL (ref ?–1.2)
BUN SERPL-MCNC: 13 MG/DL (ref 7–18)
BUN/CREAT SERPL: 10 (ref 6–20)
CALCIUM SERPL-MCNC: 9.4 MG/DL (ref 8.5–10.1)
CHLORIDE SERPL-SCNC: 105 MMOL/L (ref 98–107)
CHOLEST SERPL-MCNC: 197 MG/DL (ref 0–200)
CHOLEST/HDLC SERPL: 3.6 {RATIO}
CO2 SERPL-SCNC: 26 MMOL/L (ref 21–32)
CREAT SERPL-MCNC: 1.26 MG/DL (ref 0.7–1.3)
DIFFERENTIAL METHOD BLD: ABNORMAL
EGFR (NO RACE VARIABLE) (RUSH/TITUS): 63 ML/MIN/1.73M2
EOSINOPHIL # BLD AUTO: 0.11 K/UL (ref 0–0.5)
EOSINOPHIL NFR BLD AUTO: 1.9 % (ref 1–4)
ERYTHROCYTE [DISTWIDTH] IN BLOOD BY AUTOMATED COUNT: 13.8 % (ref 11.5–14.5)
EST. AVERAGE GLUCOSE BLD GHB EST-MCNC: 128 MG/DL
GLOBULIN SER-MCNC: 3.4 G/DL (ref 2–4)
GLUCOSE SERPL-MCNC: 117 MG/DL (ref 74–106)
HBA1C MFR BLD HPLC: 6.1 % (ref 4.5–6.6)
HCT VFR BLD AUTO: 42.8 % (ref 40–54)
HDLC SERPL-MCNC: 55 MG/DL (ref 40–60)
HGB BLD-MCNC: 14.1 G/DL (ref 13.5–18)
IMM GRANULOCYTES # BLD AUTO: 0.01 K/UL (ref 0–0.04)
IMM GRANULOCYTES NFR BLD: 0.2 % (ref 0–0.4)
LDLC SERPL CALC-MCNC: 113 MG/DL
LDLC/HDLC SERPL: 2.1 {RATIO}
LYMPHOCYTES # BLD AUTO: 2.07 K/UL (ref 1–4.8)
LYMPHOCYTES NFR BLD AUTO: 35.6 % (ref 27–41)
MCH RBC QN AUTO: 28.5 PG (ref 27–31)
MCHC RBC AUTO-ENTMCNC: 32.9 G/DL (ref 32–36)
MCV RBC AUTO: 86.6 FL (ref 80–96)
MONOCYTES # BLD AUTO: 0.58 K/UL (ref 0–0.8)
MONOCYTES NFR BLD AUTO: 10 % (ref 2–6)
MPC BLD CALC-MCNC: 10.7 FL (ref 9.4–12.4)
NEUTROPHILS # BLD AUTO: 2.99 K/UL (ref 1.8–7.7)
NEUTROPHILS NFR BLD AUTO: 51.4 % (ref 53–65)
NONHDLC SERPL-MCNC: 142 MG/DL
NRBC # BLD AUTO: 0 X10E3/UL
NRBC, AUTO (.00): 0 %
PLATELET # BLD AUTO: 244 K/UL (ref 150–400)
POTASSIUM SERPL-SCNC: 4.5 MMOL/L (ref 3.5–5.1)
PROT SERPL-MCNC: 7.7 G/DL (ref 6.4–8.2)
RBC # BLD AUTO: 4.94 M/UL (ref 4.6–6.2)
SODIUM SERPL-SCNC: 136 MMOL/L (ref 136–145)
TRIGL SERPL-MCNC: 145 MG/DL (ref 35–150)
VLDLC SERPL-MCNC: 29 MG/DL
WBC # BLD AUTO: 5.81 K/UL (ref 4.5–11)

## 2024-07-25 PROCEDURE — 1160F RVW MEDS BY RX/DR IN RCRD: CPT | Mod: ,,, | Performed by: NURSE PRACTITIONER

## 2024-07-25 PROCEDURE — 1159F MED LIST DOCD IN RCRD: CPT | Mod: ,,, | Performed by: NURSE PRACTITIONER

## 2024-07-25 PROCEDURE — 82570 ASSAY OF URINE CREATININE: CPT | Mod: ,,, | Performed by: CLINICAL MEDICAL LABORATORY

## 2024-07-25 PROCEDURE — 1101F PT FALLS ASSESS-DOCD LE1/YR: CPT | Mod: ,,, | Performed by: NURSE PRACTITIONER

## 2024-07-25 PROCEDURE — 82043 UR ALBUMIN QUANTITATIVE: CPT | Mod: ,,, | Performed by: CLINICAL MEDICAL LABORATORY

## 2024-07-25 PROCEDURE — 85025 COMPLETE CBC W/AUTO DIFF WBC: CPT | Mod: ,,, | Performed by: CLINICAL MEDICAL LABORATORY

## 2024-07-25 PROCEDURE — 3078F DIAST BP <80 MM HG: CPT | Mod: ,,, | Performed by: NURSE PRACTITIONER

## 2024-07-25 PROCEDURE — 3077F SYST BP >= 140 MM HG: CPT | Mod: ,,, | Performed by: NURSE PRACTITIONER

## 2024-07-25 PROCEDURE — 80061 LIPID PANEL: CPT | Mod: ,,, | Performed by: CLINICAL MEDICAL LABORATORY

## 2024-07-25 PROCEDURE — 83036 HEMOGLOBIN GLYCOSYLATED A1C: CPT | Mod: ,,, | Performed by: CLINICAL MEDICAL LABORATORY

## 2024-07-25 PROCEDURE — 99214 OFFICE O/P EST MOD 30 MIN: CPT | Mod: ,,, | Performed by: NURSE PRACTITIONER

## 2024-07-25 PROCEDURE — 80053 COMPREHEN METABOLIC PANEL: CPT | Mod: ,,, | Performed by: CLINICAL MEDICAL LABORATORY

## 2024-07-25 PROCEDURE — 3008F BODY MASS INDEX DOCD: CPT | Mod: ,,, | Performed by: NURSE PRACTITIONER

## 2024-07-25 PROCEDURE — 3044F HG A1C LEVEL LT 7.0%: CPT | Mod: ,,, | Performed by: NURSE PRACTITIONER

## 2024-07-25 PROCEDURE — 3288F FALL RISK ASSESSMENT DOCD: CPT | Mod: ,,, | Performed by: NURSE PRACTITIONER

## 2024-07-25 RX ORDER — HYDRALAZINE HYDROCHLORIDE 100 MG/1
100 TABLET, FILM COATED ORAL 2 TIMES DAILY
Qty: 180 TABLET | Refills: 1 | Status: SHIPPED | OUTPATIENT
Start: 2024-07-25

## 2024-07-25 RX ORDER — ROSUVASTATIN CALCIUM 5 MG/1
5 TABLET, COATED ORAL DAILY
Qty: 90 TABLET | Refills: 1 | Status: SHIPPED | OUTPATIENT
Start: 2024-07-25

## 2024-07-25 RX ORDER — METOPROLOL SUCCINATE 25 MG/1
25 TABLET, EXTENDED RELEASE ORAL DAILY
Qty: 90 TABLET | Refills: 1 | Status: SHIPPED | OUTPATIENT
Start: 2024-07-25

## 2024-07-25 NOTE — ASSESSMENT & PLAN NOTE
BP Readings from Last 3 Encounters:   07/25/24 (!) 143/76   04/25/24 (!) 145/83   02/27/24 (!) 179/82    The current medical regimen is effective;  continue present plan and medications.  Goal less 130/80  No meds today, pt also walked to clinic  Encouraged to take meds 30min prior to next appt

## 2024-07-25 NOTE — PROGRESS NOTES
BRUNO Cohn   [facility] [address] [place] [address]  [phone]      PATIENT NAME: Joey Hughes  : 1955  DATE: 24  MRN: [id number]      Billing Provider: BRUNO Cohn  Level of Service:   Patient PCP Information       Provider PCP Type    BRUNO Cohn General            Reason for Visit / Chief Complaint: Follow-up, Hyperlipidemia, and Hypertension (3 mos)       Update PCP  Update Chief Complaint         History of Present Illness / Problem Focused Workflow     Joey Hughes presents to the clinic with Follow-up, Hyperlipidemia, and Hypertension (3 mos)     Pt presents for routine follow up with lab and med refills. Overall doing well.      BP appears not at goal today. Home meds reviewed, patient reports no medications today.  He did walk to the clinic today for his appointment.  Encouraged the patient to take his medications at least 30 minutes prior to his next appointment.  Encouraged the patient to make sure that he was staying hydrated with lots of water especially if he was going to be out walking and heat of the day.  Advised to monitor BP at home. Advised on optimal BP readings - SBP < 130 & DBP < 80. Advised to call office for any persistent BP elevation and may have to prescribe or adjust BP med(s).  Recommended DASH diet, stay well hydrated with water daily, eliminate or decrease caffeinated and high calorie drinks, increase physical activity, and lose weight if BMI > 25.0. Written patient education information provided to patient with goals and recommendations to assist with BP management.     Discussed need for low fat/low cholesterol diet, regular exercise, and weight control.   Cardiovascular risk and specific lipid/LDL goals reviewed.        Review of Systems     Review of Systems   Constitutional:  Negative for fatigue and fever.   HENT:  Negative for  nasal congestion and sore throat.    Eyes:  Negative for visual disturbance.   Respiratory:  Negative for chest tightness and shortness of breath.    Cardiovascular:  Negative for chest pain and leg swelling.   Gastrointestinal:  Negative for abdominal pain and change in bowel habit.   Endocrine: Negative for polydipsia, polyphagia and polyuria.   Genitourinary:  Negative for dysuria and hematuria.   Musculoskeletal:  Negative for back pain and leg pain.   Integumentary:  Negative for rash.   Neurological:  Negative for dizziness, syncope, weakness and light-headedness.        Medical / Social / Family History     Past Medical History:   Diagnosis Date    High cholesterol     Hypertension        History reviewed. No pertinent surgical history.    Social History  Mr. Hughes  reports that he has never smoked. He has never used smokeless tobacco. He reports current alcohol use of about 2.0 standard drinks of alcohol per week. He reports that he does not use drugs.    Family History  Mr. Hughes's family history includes Cancer in his mother; Diabetes in his brother and brother.    Medications and Allergies     Medications  Outpatient Medications Marked as Taking for the 7/25/24 encounter (Office Visit) with Debbie Smith ACNP   Medication Sig Dispense Refill    mv, min #36-iron,carbonyl-FA (GERITOL COMPLETE) 16 mg iron- 0.38 mg Tab Take 1 tablet by mouth once daily.      valsartan-hydrochlorothiazide (DIOVAN-HCT) 320-25 mg per tablet Take 1 tablet by mouth once daily. 90 tablet 1       Allergies  Review of patient's allergies indicates:  No Known Allergies    Physical Examination     Vitals:    07/25/24 1305   BP: (!) 143/76   Pulse:    Resp:    Temp:      Physical Exam  Eyes:      Pupils: Pupils are equal, round, and reactive to light.   Cardiovascular:      Rate and Rhythm: Normal rate and regular rhythm.      Heart sounds: Normal heart sounds. No murmur heard.  Pulmonary:      Breath sounds: Normal breath  sounds. No wheezing, rhonchi or rales.   Abdominal:      General: Bowel sounds are normal.      Palpations: Abdomen is soft.   Musculoskeletal:         General: No swelling.      Cervical back: Normal range of motion and neck supple.   Skin:     General: Skin is warm and dry.   Neurological:      Mental Status: He is alert and oriented to person, place, and time.          Lab Results   Component Value Date    WBC 5.21 02/27/2024    HGB 13.6 02/27/2024    HCT 40.3 02/27/2024    MCV 86.3 02/27/2024     02/27/2024        Sodium   Date Value Ref Range Status   02/27/2024 137 136 - 145 mmol/L Final     Potassium   Date Value Ref Range Status   02/27/2024 4.1 3.5 - 5.1 mmol/L Final     Chloride   Date Value Ref Range Status   02/27/2024 103 98 - 107 mmol/L Final     CO2   Date Value Ref Range Status   02/27/2024 28 21 - 32 mmol/L Final     Glucose   Date Value Ref Range Status   02/27/2024 117 (H) 74 - 106 mg/dL Final     BUN   Date Value Ref Range Status   02/27/2024 14 7 - 18 mg/dL Final     Creatinine   Date Value Ref Range Status   02/27/2024 1.08 0.70 - 1.30 mg/dL Final     Calcium   Date Value Ref Range Status   02/27/2024 9.4 8.5 - 10.1 mg/dL Final     Total Protein   Date Value Ref Range Status   02/27/2024 7.5 6.4 - 8.2 g/dL Final     Albumin   Date Value Ref Range Status   02/27/2024 4.1 3.5 - 5.0 g/dL Final     Bilirubin, Total   Date Value Ref Range Status   02/27/2024 0.6 >0.0 - 1.2 mg/dL Final     Alk Phos   Date Value Ref Range Status   02/27/2024 60 45 - 115 U/L Final     AST   Date Value Ref Range Status   02/27/2024 36 15 - 37 U/L Final     ALT   Date Value Ref Range Status   02/27/2024 51 16 - 61 U/L Final     Anion Gap   Date Value Ref Range Status   02/27/2024 10 7 - 16 mmol/L Final     eGFR   Date Value Ref Range Status   02/27/2024 76 >=60 mL/min/1.73m2 Final      Lab Results   Component Value Date    HGBA1C 6.2 02/27/2024      Lab Results   Component Value Date    CHOL 221 (H) 02/27/2024     "CHOL 208 (H) 11/27/2023    CHOL 205 (H) 07/06/2023     Lab Results   Component Value Date    HDL 68 (H) 02/27/2024    HDL 61 (H) 11/27/2023    HDL 52 07/06/2023     Lab Results   Component Value Date    LDLCALC 124 02/27/2024    LDLCALC 120 11/27/2023    LDLCALC 106 07/06/2023     No results found for: "DLDL"  Lab Results   Component Value Date    TRIG 143 02/27/2024    TRIG 133 11/27/2023    TRIG 237 (H) 07/06/2023     Lab Results   Component Value Date    CHOLHDL 3.3 02/27/2024    CHOLHDL 3.4 11/27/2023    CHOLHDL 3.9 07/06/2023      Lab Results   Component Value Date    TSH 1.100 02/16/2022        Assessment and Plan (including Health Maintenance)      Problem List  Smart Sets  Document Outside HM   :    Plan:     1. Primary hypertension  Assessment & Plan:  BP Readings from Last 3 Encounters:   07/25/24 (!) 143/76   04/25/24 (!) 145/83   02/27/24 (!) 179/82    The current medical regimen is effective;  continue present plan and medications.  Goal less 130/80  No meds today, pt also walked to clinic  Encouraged to take meds 30min prior to next appt      2. Hypertension, unspecified type  Assessment & Plan:  BP Readings from Last 3 Encounters:   07/25/24 (!) 143/76   04/25/24 (!) 145/83   02/27/24 (!) 179/82    The current medical regimen is effective;  continue present plan and medications.  Goal less 130/80  No meds today, pt also walked to clinic  Encouraged to take meds 30min prior to next appt    Orders:  -     CBC Auto Differential; Future; Expected date: 07/25/2024  -     Comprehensive Metabolic Panel; Future; Expected date: 07/25/2024  -     Microalbumin/Creatinine Ratio, Urine; Future; Expected date: 07/25/2024  -     metoprolol succinate (TOPROL-XL) 25 MG 24 hr tablet; Take 1 tablet (25 mg total) by mouth once daily.  Dispense: 90 tablet; Refill: 1    3. Hyperlipidemia, unspecified hyperlipidemia type  Assessment & Plan:  Lab Results   Component Value Date    CHOL 221 (H) 02/27/2024    CHOL 208 (H) " 11/27/2023    CHOL 205 (H) 07/06/2023     Lab Results   Component Value Date    HDL 68 (H) 02/27/2024    HDL 61 (H) 11/27/2023    HDL 52 07/06/2023     Lab Results   Component Value Date    LDLCALC 124 02/27/2024    LDLCALC 120 11/27/2023    LDLCALC 106 07/06/2023     Lab Results   Component Value Date    TRIG 143 02/27/2024    TRIG 133 11/27/2023    TRIG 237 (H) 07/06/2023       Lab Results   Component Value Date    CHOLHDL 3.3 02/27/2024    CHOLHDL 3.4 11/27/2023    CHOLHDL 3.9 07/06/2023    The current medical regimen is effective;  continue present plan and medications.      Orders:  -     Lipid Panel; Future; Expected date: 07/25/2024  -     rosuvastatin (CRESTOR) 5 MG tablet; Take 1 tablet (5 mg total) by mouth once daily.  Dispense: 90 tablet; Refill: 1    4. Pre-diabetes  -     Hemoglobin A1C; Future; Expected date: 07/25/2024    Other orders  -     hydrALAZINE (APRESOLINE) 100 MG tablet; Take 1 tablet (100 mg total) by mouth 2 (two) times daily.  Dispense: 180 tablet; Refill: 1         There are no Patient Instructions on file for this visit.     Health Maintenance Due   Topic Date Due    Diabetes Urine Screening  07/06/2024         Health Maintenance Topics with due status: Not Due       Topic Last Completion Date    Colorectal Cancer Screening 02/22/2023    Influenza Vaccine 09/21/2023    PROSTATE-SPECIFIC ANTIGEN 02/27/2024    Lipid Panel 02/27/2024    Hemoglobin A1c 02/27/2024       Future Appointments   Date Time Provider Department Center   9/26/2024 11:00 AM AWV NURSE, [facility]   10/28/2024  1:00 PM Debbie Smith ACNP [facility] [place]            Signature:  BRUNO Cohn [facility] [address] [place] [address]  [phone]    Date of encounter: 7/25/24

## 2024-07-25 NOTE — ASSESSMENT & PLAN NOTE
Lab Results   Component Value Date    CHOL 221 (H) 02/27/2024    CHOL 208 (H) 11/27/2023    CHOL 205 (H) 07/06/2023     Lab Results   Component Value Date    HDL 68 (H) 02/27/2024    HDL 61 (H) 11/27/2023    HDL 52 07/06/2023     Lab Results   Component Value Date    LDLCALC 124 02/27/2024    LDLCALC 120 11/27/2023    LDLCALC 106 07/06/2023     Lab Results   Component Value Date    TRIG 143 02/27/2024    TRIG 133 11/27/2023    TRIG 237 (H) 07/06/2023       Lab Results   Component Value Date    CHOLHDL 3.3 02/27/2024    CHOLHDL 3.4 11/27/2023    CHOLHDL 3.9 07/06/2023    The current medical regimen is effective;  continue present plan and medications.

## 2024-07-26 LAB
CREAT UR-MCNC: 38 MG/DL (ref 39–259)
MICROALBUMIN UR-MCNC: 0.6 MG/DL (ref 0–2.8)
MICROALBUMIN/CREAT RATIO PNL UR: 15.8 MG/G (ref 0–30)

## 2024-09-26 ENCOUNTER — OFFICE VISIT (OUTPATIENT)
Dept: FAMILY MEDICINE | Facility: CLINIC | Age: 69
End: 2024-09-26
Payer: COMMERCIAL

## 2024-09-26 VITALS
HEART RATE: 84 BPM | WEIGHT: 224 LBS | HEIGHT: 69 IN | BODY MASS INDEX: 33.18 KG/M2 | SYSTOLIC BLOOD PRESSURE: 143 MMHG | DIASTOLIC BLOOD PRESSURE: 83 MMHG | OXYGEN SATURATION: 99 % | RESPIRATION RATE: 19 BRPM

## 2024-09-26 DIAGNOSIS — E66.3 OVERWEIGHT: ICD-10-CM

## 2024-09-26 DIAGNOSIS — E78.5 HYPERLIPIDEMIA, UNSPECIFIED HYPERLIPIDEMIA TYPE: ICD-10-CM

## 2024-09-26 DIAGNOSIS — Z23 FLU VACCINE NEED: ICD-10-CM

## 2024-09-26 DIAGNOSIS — Z00.00 ENCOUNTER FOR SUBSEQUENT ANNUAL WELLNESS VISIT (AWV) IN MEDICARE PATIENT: Primary | ICD-10-CM

## 2024-09-26 DIAGNOSIS — I10 PRIMARY HYPERTENSION: ICD-10-CM

## 2024-09-26 PROCEDURE — 3077F SYST BP >= 140 MM HG: CPT | Mod: ,,,

## 2024-09-26 PROCEDURE — 3044F HG A1C LEVEL LT 7.0%: CPT | Mod: ,,,

## 2024-09-26 PROCEDURE — 1158F ADVNC CARE PLAN TLK DOCD: CPT | Mod: ,,,

## 2024-09-26 PROCEDURE — G0008 ADMIN INFLUENZA VIRUS VAC: HCPCS | Mod: ,,,

## 2024-09-26 PROCEDURE — 3079F DIAST BP 80-89 MM HG: CPT | Mod: ,,,

## 2024-09-26 PROCEDURE — G0439 PPPS, SUBSEQ VISIT: HCPCS | Mod: ,,,

## 2024-09-26 PROCEDURE — 90653 IIV ADJUVANT VACCINE IM: CPT | Mod: ,,,

## 2024-09-26 PROCEDURE — 3066F NEPHROPATHY DOC TX: CPT | Mod: ,,,

## 2024-09-26 PROCEDURE — 1126F AMNT PAIN NOTED NONE PRSNT: CPT | Mod: ,,,

## 2024-09-26 PROCEDURE — 1170F FXNL STATUS ASSESSED: CPT | Mod: ,,,

## 2024-09-26 PROCEDURE — 1159F MED LIST DOCD IN RCRD: CPT | Mod: ,,,

## 2024-09-26 PROCEDURE — 3061F NEG MICROALBUMINURIA REV: CPT | Mod: ,,,

## 2024-09-26 NOTE — PATIENT INSTRUCTIONS
Counseling and Referral of Other Preventative  (Italic type indicates deductible and co-insurance are waived)    Patient Name: Joey Hughes  Today's Date: 9/26/2024    Health Maintenance       Date Due Completion Date    Shingles Vaccine (1 of 2) Never done ---    TETANUS VACCINE 11/27/2024 (Originally 5/20/1975) ---    PROSTATE-SPECIFIC ANTIGEN 02/27/2025 2/27/2024    Diabetes Urine Screening 07/25/2025 7/25/2024    Hemoglobin A1c 07/25/2025 7/25/2024    Colorectal Cancer Screening 02/22/2026 2/22/2023    Lipid Panel 07/25/2029 7/25/2024        No orders of the defined types were placed in this encounter.      The following information is provided to all patients.  This information is to help you find resources for any of the problems found today that may be affecting your health:                  Living healthy guide: [facility].gov    Understanding Diabetes: www.diabetes.org      Eating healthy: www.cdc.gov/healthyweight      [facility] [place] home safety checklist: www.cdc.gov/steadi/patient.html      Agency on Aging: [place]aging.org    Alcoholics anonymous (AA): www.aa.org      Physical Activity: www.tena.nih.gov/lb1nhyr      Tobacco use: [facility] Original[facility]alth.gov

## 2024-09-26 NOTE — PROGRESS NOTES
"   [facility]       PATIENT NAME: Joey Hughes   : 1955    AGE: 69 y.o. DATE: 2024   MRN: [id number]        Joey Hughes presented for a  Medicare AWV and comprehensive Health Risk Assessment today. The following components were reviewed and updated:    Medical history  Family History  Social history  Allergies and Current Medications  Health Risk Assessment  Health Maintenance  Care Team         ** See Completed Assessments for Annual Wellness Visit within the encounter summary.**         The following assessments were completed:  Living Situation  CAGE  Depression Screening  Timed Get Up and Go  Whisper Test  Cognitive Function Screening  Nutrition Screening  ADL Screening  PAQ Screening      Opioid documentation:      Patient does not have a current opioid prescription.        Vitals:    24 1102 24 1115   BP: (!) 171/106 (!) 143/83   BP Location: Left arm Left arm   Patient Position: Sitting Sitting   BP Method: Large (Automatic) Large (Automatic)   Pulse: 84    Resp: 19    SpO2: 99%    Weight: 101.6 kg (224 lb)    Height: 5' 9" (1.753 m)      Body mass index is 33.08 kg/m².  Physical Exam  Constitutional:       Appearance: Normal appearance. He is obese.   HENT:      Mouth/Throat:      Mouth: Mucous membranes are moist.   Eyes:      Pupils: Pupils are equal, round, and reactive to light.   Cardiovascular:      Rate and Rhythm: Normal rate and regular rhythm.      Pulses: Normal pulses.      Heart sounds: Normal heart sounds. No murmur heard.  Pulmonary:      Effort: Pulmonary effort is normal.      Breath sounds: Normal breath sounds.   Abdominal:      General: Bowel sounds are normal.      Palpations: Abdomen is soft.      Tenderness: There is no abdominal tenderness.   Musculoskeletal:         General: Normal range of motion.      Cervical back: Normal range of motion and neck supple.   Skin:     General: Skin is " warm and dry.      Capillary Refill: Capillary refill takes less than 2 seconds.   Neurological:      General: No focal deficit present.      Mental Status: He is alert and oriented to person, place, and time.   Psychiatric:         Mood and Affect: Mood normal.              Diagnoses and health risks identified today and associated recommendations/orders:    1. Encounter for subsequent annual wellness visit (AWV) in Medicare patient  Gave appt reminder for next year's AWV    2. Primary hypertension  BP not at goal today. Advised on DASH diet. Continue current medications. Check BP daily and keep log.     3. Hyperlipidemia, unspecified hyperlipidemia type  Advised on Low fat/cholesterol diet. Continue Crestor    4. Overweight  Encouraged diet & exercise to decrease weight/BMI    5. BMI 33.0-33.9,adult    6. Flu vaccine need  Discussed with pt & given today  - influenza (adjuvanted) (Fluad) 45 mcg/0.5 mL IM vaccine (> or = 66 yo) 0.5 mL    High-dose flu vaccine given today as well as VIS sheet.  Encouraged TDAP/Shingles vaccine at pharmacy.    Provided Joey with a 5-10 year written screening schedule and personal prevention plan. Recommendations were developed using the USPSTF age appropriate recommendations. Education, counseling, and referrals were provided as needed. After Visit Summary printed and given to patient which includes a list of additional screenings\tests needed.    Follow up in about 1 year (around 9/26/2025) for AWV follow-up.    Akua Solo RN    I offered to discuss advanced care planning, including how to pick a person who would make decisions for you if you were unable to make them for yourself, called a health care power of , and what kind of decisions you might make such as use of life sustaining treatments such as ventilators and tube feeding when faced with a life limiting illness recorded on a living will that they will need to know. (How you want to be cared for as you near  the end of your natural life)     X Patient is interested in learning more about how to make advanced directives.  I provided them paperwork and offered to discuss this with them.    Signature: Chasidy HCEW

## 2024-10-28 ENCOUNTER — OFFICE VISIT (OUTPATIENT)
Dept: FAMILY MEDICINE | Facility: CLINIC | Age: 69
End: 2024-10-28
Payer: COMMERCIAL

## 2024-10-28 VITALS
HEIGHT: 69 IN | BODY MASS INDEX: 33.47 KG/M2 | TEMPERATURE: 98 F | DIASTOLIC BLOOD PRESSURE: 77 MMHG | OXYGEN SATURATION: 99 % | RESPIRATION RATE: 16 BRPM | SYSTOLIC BLOOD PRESSURE: 123 MMHG | HEART RATE: 84 BPM | WEIGHT: 226 LBS

## 2024-10-28 DIAGNOSIS — E78.5 HYPERLIPIDEMIA, UNSPECIFIED HYPERLIPIDEMIA TYPE: ICD-10-CM

## 2024-10-28 DIAGNOSIS — I10 PRIMARY HYPERTENSION: Primary | ICD-10-CM

## 2024-10-28 DIAGNOSIS — R73.03 PRE-DIABETES: ICD-10-CM

## 2024-10-28 DIAGNOSIS — K42.9 UMBILICAL HERNIA WITHOUT OBSTRUCTION AND WITHOUT GANGRENE: ICD-10-CM

## 2024-10-28 LAB
ALBUMIN SERPL BCP-MCNC: 3.9 G/DL (ref 3.5–5)
ALBUMIN/GLOB SERPL: 1.1 {RATIO}
ALP SERPL-CCNC: 57 U/L (ref 45–115)
ALT SERPL W P-5'-P-CCNC: 40 U/L (ref 16–61)
ANION GAP SERPL CALCULATED.3IONS-SCNC: 9 MMOL/L (ref 7–16)
AST SERPL W P-5'-P-CCNC: 26 U/L (ref 15–37)
BILIRUB SERPL-MCNC: 0.5 MG/DL (ref ?–1.2)
BUN SERPL-MCNC: 18 MG/DL (ref 7–18)
BUN/CREAT SERPL: 17 (ref 6–20)
CALCIUM SERPL-MCNC: 9.6 MG/DL (ref 8.5–10.1)
CHLORIDE SERPL-SCNC: 107 MMOL/L (ref 98–107)
CHOLEST SERPL-MCNC: 187 MG/DL (ref 0–200)
CHOLEST/HDLC SERPL: 3.2 {RATIO}
CO2 SERPL-SCNC: 26 MMOL/L (ref 21–32)
CREAT SERPL-MCNC: 1.07 MG/DL (ref 0.7–1.3)
EGFR (NO RACE VARIABLE) (RUSH/TITUS): 76 ML/MIN/1.73M2
EST. AVERAGE GLUCOSE BLD GHB EST-MCNC: 131 MG/DL
GLOBULIN SER-MCNC: 3.5 G/DL (ref 2–4)
GLUCOSE SERPL-MCNC: 136 MG/DL (ref 74–106)
HBA1C MFR BLD HPLC: 6.2 % (ref 4.5–6.6)
HDLC SERPL-MCNC: 59 MG/DL (ref 40–60)
LDLC SERPL CALC-MCNC: 100 MG/DL
LDLC/HDLC SERPL: 1.7 {RATIO}
NONHDLC SERPL-MCNC: 128 MG/DL
POTASSIUM SERPL-SCNC: 3.8 MMOL/L (ref 3.5–5.1)
PROT SERPL-MCNC: 7.4 G/DL (ref 6.4–8.2)
SODIUM SERPL-SCNC: 138 MMOL/L (ref 136–145)
TRIGL SERPL-MCNC: 140 MG/DL (ref 35–150)
VLDLC SERPL-MCNC: 28 MG/DL

## 2024-10-28 PROCEDURE — 99214 OFFICE O/P EST MOD 30 MIN: CPT | Mod: ,,, | Performed by: NURSE PRACTITIONER

## 2024-10-28 PROCEDURE — 80061 LIPID PANEL: CPT | Mod: ,,, | Performed by: CLINICAL MEDICAL LABORATORY

## 2024-10-28 PROCEDURE — 1160F RVW MEDS BY RX/DR IN RCRD: CPT | Mod: ,,, | Performed by: NURSE PRACTITIONER

## 2024-10-28 PROCEDURE — 3066F NEPHROPATHY DOC TX: CPT | Mod: ,,, | Performed by: NURSE PRACTITIONER

## 2024-10-28 PROCEDURE — 3044F HG A1C LEVEL LT 7.0%: CPT | Mod: ,,, | Performed by: NURSE PRACTITIONER

## 2024-10-28 PROCEDURE — 3061F NEG MICROALBUMINURIA REV: CPT | Mod: ,,, | Performed by: NURSE PRACTITIONER

## 2024-10-28 PROCEDURE — 1159F MED LIST DOCD IN RCRD: CPT | Mod: ,,, | Performed by: NURSE PRACTITIONER

## 2024-10-28 PROCEDURE — 3008F BODY MASS INDEX DOCD: CPT | Mod: ,,, | Performed by: NURSE PRACTITIONER

## 2024-10-28 PROCEDURE — 3074F SYST BP LT 130 MM HG: CPT | Mod: ,,, | Performed by: NURSE PRACTITIONER

## 2024-10-28 PROCEDURE — 3078F DIAST BP <80 MM HG: CPT | Mod: ,,, | Performed by: NURSE PRACTITIONER

## 2024-10-28 PROCEDURE — 83036 HEMOGLOBIN GLYCOSYLATED A1C: CPT | Mod: ,,, | Performed by: CLINICAL MEDICAL LABORATORY

## 2024-10-28 PROCEDURE — 80053 COMPREHEN METABOLIC PANEL: CPT | Mod: ,,, | Performed by: CLINICAL MEDICAL LABORATORY

## 2024-10-28 PROCEDURE — 1101F PT FALLS ASSESS-DOCD LE1/YR: CPT | Mod: ,,, | Performed by: NURSE PRACTITIONER

## 2024-10-28 PROCEDURE — 3288F FALL RISK ASSESSMENT DOCD: CPT | Mod: ,,, | Performed by: NURSE PRACTITIONER

## 2024-10-28 RX ORDER — DICLOFENAC SODIUM 50 MG/1
50 TABLET, DELAYED RELEASE ORAL 2 TIMES DAILY PRN
Qty: 60 TABLET | Refills: 1 | Status: SHIPPED | OUTPATIENT
Start: 2024-10-28

## 2024-12-04 ENCOUNTER — CLINICAL SUPPORT (OUTPATIENT)
Dept: CARDIOLOGY | Facility: CLINIC | Age: 69
End: 2024-12-04
Attending: SURGERY
Payer: COMMERCIAL

## 2024-12-04 ENCOUNTER — OFFICE VISIT (OUTPATIENT)
Dept: SURGERY | Facility: CLINIC | Age: 69
End: 2024-12-04
Attending: SURGERY
Payer: COMMERCIAL

## 2024-12-04 VITALS — BODY MASS INDEX: 33.34 KG/M2 | WEIGHT: 220 LBS | HEIGHT: 68 IN

## 2024-12-04 DIAGNOSIS — K42.9 UMBILICAL HERNIA WITHOUT OBSTRUCTION AND WITHOUT GANGRENE: Primary | ICD-10-CM

## 2024-12-04 DIAGNOSIS — K42.9 UMBILICAL HERNIA WITHOUT OBSTRUCTION AND WITHOUT GANGRENE: ICD-10-CM

## 2024-12-04 PROCEDURE — 1101F PT FALLS ASSESS-DOCD LE1/YR: CPT | Mod: CPTII,,, | Performed by: SURGERY

## 2024-12-04 PROCEDURE — 99999 PR PBB SHADOW E&M-EST. PATIENT-LVL II: CPT | Mod: PBBFAC,,,

## 2024-12-04 PROCEDURE — 3066F NEPHROPATHY DOC TX: CPT | Mod: CPTII,,, | Performed by: SURGERY

## 2024-12-04 PROCEDURE — 93010 ELECTROCARDIOGRAM REPORT: CPT | Mod: S$PBB,,, | Performed by: INTERNAL MEDICINE

## 2024-12-04 PROCEDURE — 3008F BODY MASS INDEX DOCD: CPT | Mod: CPTII,,, | Performed by: SURGERY

## 2024-12-04 PROCEDURE — 3044F HG A1C LEVEL LT 7.0%: CPT | Mod: CPTII,,, | Performed by: SURGERY

## 2024-12-04 PROCEDURE — 99215 OFFICE O/P EST HI 40 MIN: CPT | Mod: PBBFAC | Performed by: SURGERY

## 2024-12-04 PROCEDURE — 99999 PR PBB SHADOW E&M-EST. PATIENT-LVL V: CPT | Mod: PBBFAC,,, | Performed by: SURGERY

## 2024-12-04 PROCEDURE — 3061F NEG MICROALBUMINURIA REV: CPT | Mod: CPTII,,, | Performed by: SURGERY

## 2024-12-04 PROCEDURE — 1159F MED LIST DOCD IN RCRD: CPT | Mod: CPTII,,, | Performed by: SURGERY

## 2024-12-04 PROCEDURE — 3288F FALL RISK ASSESSMENT DOCD: CPT | Mod: CPTII,,, | Performed by: SURGERY

## 2024-12-04 PROCEDURE — 93005 ELECTROCARDIOGRAM TRACING: CPT | Mod: PBBFAC | Performed by: INTERNAL MEDICINE

## 2024-12-04 PROCEDURE — 99204 OFFICE O/P NEW MOD 45 MIN: CPT | Mod: S$PBB,,, | Performed by: SURGERY

## 2024-12-04 PROCEDURE — 1126F AMNT PAIN NOTED NONE PRSNT: CPT | Mod: CPTII,,, | Performed by: SURGERY

## 2024-12-04 PROCEDURE — 99212 OFFICE O/P EST SF 10 MIN: CPT | Mod: PBBFAC,25

## 2024-12-04 NOTE — PROGRESS NOTES
"Subjective:           Patient ID: Joey Hughes is a 69 y.o. male.    Chief Complaint: Hernia      69-year-old male patient who presents with a bulge in the umbilical region that has gradually enlarged.  He reports occasional discomfort.  He denies problems with the his bowels.  He was not had fevers or chills.  Patient was referred for repair of ventral hernia.    family history includes Cancer in his mother; Diabetes in his brother and brother.    Past Medical History:   Diagnosis Date    High cholesterol     Hypertension         Current Outpatient Medications on File Prior to Visit   Medication Sig Dispense Refill    diclofenac (VOLTAREN) 50 MG EC tablet Take 1 tablet (50 mg total) by mouth 2 (two) times daily as needed (arthritis). 60 tablet 1    hydrALAZINE (APRESOLINE) 100 MG tablet Take 1 tablet (100 mg total) by mouth 2 (two) times daily. 180 tablet 1    metoprolol succinate (TOPROL-XL) 25 MG 24 hr tablet Take 1 tablet (25 mg total) by mouth once daily. 90 tablet 1    mv, min #36-iron,carbonyl-FA (GERITOL COMPLETE) 16 mg iron- 0.38 mg Tab Take 1 tablet by mouth once daily.      rosuvastatin (CRESTOR) 5 MG tablet Take 1 tablet (5 mg total) by mouth once daily. 90 tablet 1    valsartan-hydrochlorothiazide (DIOVAN-HCT) 320-25 mg per tablet Take 1 tablet by mouth once daily. 90 tablet 1     No current facility-administered medications on file prior to visit.       Review of patient's allergies indicates:  No Known Allergies    History reviewed. No pertinent surgical history.      reports that he has never smoked. He has never used smokeless tobacco. He reports current alcohol use of about 7.0 standard drinks of alcohol per week. He reports that he does not use drugs.       Review of Systems   All other systems reviewed and are negative.           Objective:      Ht 5' 8" (1.727 m)   Wt 99.8 kg (220 lb)   BMI 33.45 kg/m²    Physical Exam  Cardiovascular:      Rate and Rhythm: Normal rate and regular " rhythm.      Heart sounds: Normal heart sounds.   Pulmonary:      Breath sounds: Normal breath sounds.   Abdominal:      Palpations: Abdomen is soft.      Hernia: A hernia (definite bulge within the umbilicus.  There is an bulge slightly cephalad as well, indicating likely 2 separate fascial defects.  The bulge above the umbilicus could not be reduced enough to evaluate exact size.  Umbilical bulge is roughly 1 cm.) is present.   Musculoskeletal:         General: No swelling.      Cervical back: No tenderness.   Skin:     Coloration: Skin is not jaundiced.   Neurological:      General: No focal deficit present.      Mental Status: He is alert.   Psychiatric:         Mood and Affect: Mood normal.       Assessment/Plan:     Problem List Items Addressed This Visit          GI    Umbilical hernia without obstruction and without gangrene - Primary    Current Assessment & Plan      Risks and benefits of surgery were discussed including infection, bleeding, recurrence, neuralgia, mesh problems, injury to the cord and cord structures which could result in testicular atrophy, and the unforeseen. He understands wish to proceed.          Relevant Orders    CBC Auto Differential (Completed)    Basic Metabolic Panel    EKG 12-lead    Case Request Operating Room: REPAIR, HERNIA, UMBILICAL (Completed)

## 2024-12-04 NOTE — ASSESSMENT & PLAN NOTE
Risks and benefits of surgery were discussed including infection, bleeding, recurrence, neuralgia, mesh problems, injury to the cord and cord structures which could result in testicular atrophy, and the unforeseen. He understands wish to proceed.

## 2024-12-04 NOTE — PATIENT INSTRUCTIONS
[facility]  Instructions for surgery        Your surgery is scheduled for 12/19/24 at [facility] on the 1st floor of [facility].Your arrival time is 0600 a.m   You will be notified the day before  surgery verifying your arrival time for surgery.    Your preop lab work will be done today. Lab is on the 1st floor of the clinic. EKG is on 2nd floor of the clinic.                                                                                                                                                                                                                                                                                                                                                                           Day of Surgery Instructions      Bring a list of all your medications with you the day of your surgery. You can also give the list to your doctor or nurse during your final clinic appointment before surgery.      Do not eat any solid foods or drink any liquids after 12:00 AM (midnight). This includes gum, hard candy, mints, and chewing tobacco.  Medications: Take any medications specified with a small sip of water the morning of your surgery.  Brush your teeth: You may brush your teeth and rinse your mouth. Do not swallow any water or toothpaste.  Clothing: A button front shirt and loose-fitting clothes are the most comfortable before and after surgery. We also recommend low-heeled shoes.  Hair: Avoid buns, ponytails, or hairpieces at the back of the head. Remove or avoid any clips, pins or bands that bind hair. Do not use hairspray. Before going to surgery, you will need to remove any wigs or hairpieces.  We will cover your hair during surgery. Your privacy regarding personal appearance will be respected.  Fingernails: Please be sure to remove all nail polish before you arrive for surgery. We understand that tips, wraps, gels, etc., are  expensive; however, we ask these products to be removed from at least one finger on each hand. Your fingertips are used to accurately monitor your oxygen level during surgery by a device called an oximeter.  Glasses and Contact Lenses: Wear glasses when possible. If contact lenses must be worn, bring a lens case and solution. If glasses are worn, bring a case for them.  Hearing Aids: If you rely on a hearing aid, wear it to the hospital on the day of surgery. This will ensure you can hear and understand everything we need to communicate with you.  Valuables: Jewelry, including body piercings, Dentures, money, and credit cards should be left at home. [facility] is not responsible for valuables that are not secured in our surgery center.  Makeup, Perfume, Creams, Lotions and Deodorants: Do not use any of these products on the day of surgery. Remove false eyelashes prior to surgery.  Implanted Medical Devices: If you have an implanted device, such as a pacemaker or AICD, bring the device information card (if you have it) with you.  Medical Equipment: If you have been fitted for a brace to wear after surgery or you have been given crutches, bring those with you to the surgery center.  Shower: Take a shower with Hibiclens® (chlorhexidine) (available over the counter). This reduces the chance of infection. PLEASE USE CHLORHEXIDINE WASH THE NIGHT BEFORE SURGERY AND THE MORNING OF SURGERY.  ONLY 2 VISITORS ARE ALLOWED WITH YOU ON DAY OF SURGERY.        Medication instructions:  You may take blood pressure medication with a small drink of water the morning of surgery.        IF YOU ARE ON ANY OF THESE BLOOD THINNERS, MAKE SURE YOUR PHYSICIAN IS AWARE.  Eliquis/Apixaban            Wafarin/Coumadin,Jantoven  Xarelto/Rivaroxaban      Pletal/Cilostazol  Plavix/Clopidogrel          Pradaxa/Dibigatran      If you are diabetic      Follow the diabetic medicine instructions you received during your pre-operative visit.  DO NOT take  your insulin or diabetic medications the morning of surgery.  When you arrive at the surgical center, be sure to tell the nurse you are diabetic.    The following blood sugar medications have to be stopped prior to surgery:    Hold 24 hours prior to surgery:    Libraglutide - Saxenda, Victoza  Lixisenatide --Adlxyin  Exenatide  --  Byetta  Empaglifozin--Jardiance  Sitaglitin--Januvia    Hold 1 week prior to surgery:    Semaglutide - Ozempic, Wegouy, Rybelsus  Dulaglutide - Trulicity  Tirzepatide - Mounjaro  Exenatide (extended release inj)-- Bydureon BCise      Hold 48 hours prior to surgery:    Metformin, Glucovance, Metaglip, Fortamet, Glucophage, Riomet, Avandamet, Glimepiride            Other Items to bring with you and know    Insurance card  Identification card such as 's license, passport, or other picture ID  Copy of your advance directives  List of medications and allergies, if not already provided  Name and phone number of person to contact if your condition changes significantly. YOU CANNOT DRIVE YOURSELF HOME FROM THE HOSPITAL THE DAY OF SURGERY.  PLEASE UNDERSTAND THAT OUR OFFICE DOES NOT GIVE PATHOLOGY RESULTS OR TEST RESULTS OVER THE PHONE. THIS WILL BE DISCUSSED WITH YOU ON YOUR FOLLOW UP APPOINTMENT.  IF YOU SUBMIT FMLA FORMS, IT WILL TAKE 3-7 DAYS TO COMPLETE THESE          Alcohol and Surgery  We want to help you prepare for and recover from surgery as quickly and safely as possible. Be open and honest with your provider about how many drinks you have per day. Excessive alcohol use is defined as drinking more than three drinks per day. It can affect the outcome of your surgery. Binge drinking (consuming large amounts of alcohol infrequently, such as on weekends) can also affect the outcome of your surgery.  Alcohol withdrawal  If you drink more than three drinks a day, you could have a complication, called alcohol withdrawal, after surgery.  Alcohol withdrawal is a set of symptoms that  people have when they suddenly stop drinking after using alcohol  for a long time. During withdrawal, a person's central nervous system overreacts. This can cause mild symptoms such as shakiness, sweating or hallucinating. It can also cause other more serious side effects. If not treated properly, alcohol withdrawal can cause potentially life-threatening complications after surgery. This can include tremors, seizures, hallucinations, delirium tremors, and even death. Untreated alcohol withdrawal often leads to a longer stay in the hospital, potentially in the Intensive Care Unit.  Chronic heavy drinking also can interfere with several organ systems and biochemical processes in the body.  This interference can cause serious, even life-threatening complications.  Your care team can offer alcohol withdrawal treatment to help:  Decrease the risk of seizures and delirium tremors after surgery  Decrease the risk we will need to restrain you for your own safety or the safety of others  Decrease your risk of falling after surgery  Reduce the use of potent sedative medications  Reduce the time you stay in the hospital after surgery  Reduce the time you might spend on a mechanical ventilator to help you breathe  Lower incidence of organ failure and biochemical complications  Talk to a member of your care team or your primary care physician about your alcohol use if you feel you may be at risk of any of these complications.        Smoking and Surgery  Quitting smoking is extremely important for a successful surgery and recovery. Cigarette smoking compromises your immune system. This increases your risk of an infection after surgery. Quitting the habit before surgery will decrease the surgical risks associated with smoking.

## 2024-12-06 LAB
OHS QRS DURATION: 86 MS
OHS QTC CALCULATION: 394 MS

## 2024-12-19 ENCOUNTER — ANESTHESIA EVENT (OUTPATIENT)
Dept: SURGERY | Facility: HOSPITAL | Age: 69
End: 2024-12-19
Payer: COMMERCIAL

## 2024-12-19 ENCOUNTER — ANESTHESIA (OUTPATIENT)
Dept: SURGERY | Facility: HOSPITAL | Age: 69
End: 2024-12-19
Payer: COMMERCIAL

## 2024-12-19 ENCOUNTER — HOSPITAL ENCOUNTER (OUTPATIENT)
Facility: HOSPITAL | Age: 69
Discharge: HOME | End: 2024-12-19
Attending: SURGERY | Admitting: SURGERY
Payer: COMMERCIAL

## 2024-12-19 VITALS
TEMPERATURE: 98 F | HEART RATE: 74 BPM | RESPIRATION RATE: 16 BRPM | HEIGHT: 69 IN | DIASTOLIC BLOOD PRESSURE: 89 MMHG | SYSTOLIC BLOOD PRESSURE: 167 MMHG | BODY MASS INDEX: 32.58 KG/M2 | OXYGEN SATURATION: 95 % | WEIGHT: 220 LBS

## 2024-12-19 DIAGNOSIS — K42.9 UMBILICAL HERNIA WITHOUT OBSTRUCTION AND WITHOUT GANGRENE: Primary | ICD-10-CM

## 2024-12-19 PROCEDURE — 27000177 HC AIRWAY, LARYNGEAL MASK: Performed by: NURSE ANESTHETIST, CERTIFIED REGISTERED

## 2024-12-19 PROCEDURE — 36000707: Performed by: SURGERY

## 2024-12-19 PROCEDURE — 63600175 PHARM REV CODE 636 W HCPCS: Performed by: ANESTHESIOLOGY

## 2024-12-19 PROCEDURE — 63600175 PHARM REV CODE 636 W HCPCS: Mod: JZ,JG | Performed by: SURGERY

## 2024-12-19 PROCEDURE — 36000706: Performed by: SURGERY

## 2024-12-19 PROCEDURE — 25000003 PHARM REV CODE 250: Performed by: NURSE PRACTITIONER

## 2024-12-19 PROCEDURE — 71000015 HC POSTOP RECOV 1ST HR: Performed by: SURGERY

## 2024-12-19 PROCEDURE — 71000016 HC POSTOP RECOV ADDL HR: Performed by: SURGERY

## 2024-12-19 PROCEDURE — 37000008 HC ANESTHESIA 1ST 15 MINUTES: Performed by: SURGERY

## 2024-12-19 PROCEDURE — 71000033 HC RECOVERY, INTIAL HOUR: Performed by: SURGERY

## 2024-12-19 PROCEDURE — 27000716 HC OXISENSOR PROBE, ANY SIZE: Performed by: NURSE ANESTHETIST, CERTIFIED REGISTERED

## 2024-12-19 PROCEDURE — 25000003 PHARM REV CODE 250: Performed by: NURSE ANESTHETIST, CERTIFIED REGISTERED

## 2024-12-19 PROCEDURE — 49594 RPR AA HRN 1ST 3-10 NCR/STRN: CPT | Mod: ,,, | Performed by: SURGERY

## 2024-12-19 PROCEDURE — 37000009 HC ANESTHESIA EA ADD 15 MINS: Performed by: SURGERY

## 2024-12-19 PROCEDURE — 27000655: Performed by: NURSE ANESTHETIST, CERTIFIED REGISTERED

## 2024-12-19 PROCEDURE — C1781 MESH (IMPLANTABLE): HCPCS | Performed by: SURGERY

## 2024-12-19 PROCEDURE — 63600175 PHARM REV CODE 636 W HCPCS: Performed by: NURSE ANESTHETIST, CERTIFIED REGISTERED

## 2024-12-19 DEVICE — COMPOSITE MESH,MONOFILAMENT POLYESTER WITH ABSORBABLE COLLAGEN FILM AND MARKING
Type: IMPLANTABLE DEVICE | Site: ABDOMEN | Status: FUNCTIONAL
Brand: SYMBOTEX

## 2024-12-19 RX ORDER — ONDANSETRON HYDROCHLORIDE 2 MG/ML
INJECTION, SOLUTION INTRAVENOUS
Status: DISCONTINUED | OUTPATIENT
Start: 2024-12-19 | End: 2024-12-19

## 2024-12-19 RX ORDER — HYDROCODONE BITARTRATE AND ACETAMINOPHEN 7.5; 325 MG/1; MG/1
1 TABLET ORAL EVERY 6 HOURS PRN
Qty: 30 TABLET | Refills: 0 | Status: SHIPPED | OUTPATIENT
Start: 2024-12-19

## 2024-12-19 RX ORDER — MEPERIDINE HYDROCHLORIDE 25 MG/ML
25 INJECTION INTRAMUSCULAR; INTRAVENOUS; SUBCUTANEOUS EVERY 10 MIN PRN
Status: DISCONTINUED | OUTPATIENT
Start: 2024-12-19 | End: 2024-12-19 | Stop reason: HOSPADM

## 2024-12-19 RX ORDER — PROPOFOL 10 MG/ML
VIAL (ML) INTRAVENOUS
Status: DISCONTINUED | OUTPATIENT
Start: 2024-12-19 | End: 2024-12-19

## 2024-12-19 RX ORDER — EPHEDRINE SULFATE 50 MG/ML
INJECTION, SOLUTION INTRAVENOUS
Status: DISCONTINUED | OUTPATIENT
Start: 2024-12-19 | End: 2024-12-19

## 2024-12-19 RX ORDER — HYDROCODONE BITARTRATE AND ACETAMINOPHEN 10; 325 MG/1; MG/1
1 TABLET ORAL EVERY 4 HOURS PRN
Status: DISCONTINUED | OUTPATIENT
Start: 2024-12-19 | End: 2024-12-19 | Stop reason: HOSPADM

## 2024-12-19 RX ORDER — ONDANSETRON 4 MG/1
8 TABLET, ORALLY DISINTEGRATING ORAL EVERY 8 HOURS PRN
Status: DISCONTINUED | OUTPATIENT
Start: 2024-12-19 | End: 2024-12-19 | Stop reason: HOSPADM

## 2024-12-19 RX ORDER — ROCURONIUM BROMIDE 10 MG/ML
INJECTION, SOLUTION INTRAVENOUS
Status: DISCONTINUED | OUTPATIENT
Start: 2024-12-19 | End: 2024-12-19

## 2024-12-19 RX ORDER — IBUPROFEN 600 MG/1
600 TABLET ORAL EVERY 6 HOURS PRN
Status: DISCONTINUED | OUTPATIENT
Start: 2024-12-19 | End: 2024-12-19 | Stop reason: HOSPADM

## 2024-12-19 RX ORDER — GLUCAGON 1 MG
1 KIT INJECTION
Status: DISCONTINUED | OUTPATIENT
Start: 2024-12-19 | End: 2024-12-19 | Stop reason: HOSPADM

## 2024-12-19 RX ORDER — FENTANYL CITRATE 50 UG/ML
INJECTION, SOLUTION INTRAMUSCULAR; INTRAVENOUS
Status: DISCONTINUED | OUTPATIENT
Start: 2024-12-19 | End: 2024-12-19

## 2024-12-19 RX ORDER — MORPHINE SULFATE 10 MG/ML
4 INJECTION INTRAMUSCULAR; INTRAVENOUS; SUBCUTANEOUS ONCE
Status: DISCONTINUED | OUTPATIENT
Start: 2024-12-19 | End: 2024-12-19 | Stop reason: HOSPADM

## 2024-12-19 RX ORDER — DIPHENHYDRAMINE HYDROCHLORIDE 50 MG/ML
25 INJECTION INTRAMUSCULAR; INTRAVENOUS EVERY 6 HOURS PRN
Status: DISCONTINUED | OUTPATIENT
Start: 2024-12-19 | End: 2024-12-19 | Stop reason: HOSPADM

## 2024-12-19 RX ORDER — ONDANSETRON HYDROCHLORIDE 2 MG/ML
4 INJECTION, SOLUTION INTRAVENOUS DAILY PRN
Status: DISCONTINUED | OUTPATIENT
Start: 2024-12-19 | End: 2024-12-19 | Stop reason: HOSPADM

## 2024-12-19 RX ORDER — HYDROMORPHONE HYDROCHLORIDE 2 MG/ML
0.5 INJECTION, SOLUTION INTRAMUSCULAR; INTRAVENOUS; SUBCUTANEOUS EVERY 5 MIN PRN
Status: DISCONTINUED | OUTPATIENT
Start: 2024-12-19 | End: 2024-12-19 | Stop reason: HOSPADM

## 2024-12-19 RX ORDER — BUPIVACAINE HYDROCHLORIDE 2.5 MG/ML
INJECTION, SOLUTION EPIDURAL; INFILTRATION; INTRACAUDAL
Status: DISCONTINUED | OUTPATIENT
Start: 2024-12-19 | End: 2024-12-19 | Stop reason: HOSPADM

## 2024-12-19 RX ORDER — CEFAZOLIN SODIUM 1 G/3ML
INJECTION, POWDER, FOR SOLUTION INTRAMUSCULAR; INTRAVENOUS
Status: DISCONTINUED | OUTPATIENT
Start: 2024-12-19 | End: 2024-12-19

## 2024-12-19 RX ORDER — LIDOCAINE HYDROCHLORIDE 20 MG/ML
INJECTION, SOLUTION EPIDURAL; INFILTRATION; INTRACAUDAL; PERINEURAL
Status: DISCONTINUED | OUTPATIENT
Start: 2024-12-19 | End: 2024-12-19

## 2024-12-19 RX ORDER — MORPHINE SULFATE 10 MG/ML
4 INJECTION INTRAMUSCULAR; INTRAVENOUS; SUBCUTANEOUS EVERY 5 MIN PRN
Status: DISCONTINUED | OUTPATIENT
Start: 2024-12-19 | End: 2024-12-19 | Stop reason: HOSPADM

## 2024-12-19 RX ADMIN — LIDOCAINE HYDROCHLORIDE 50 MG: 20 INJECTION, SOLUTION EPIDURAL; INFILTRATION; INTRACAUDAL; PERINEURAL at 10:12

## 2024-12-19 RX ADMIN — EPHEDRINE SULFATE 10 MG: 50 INJECTION INTRAVENOUS at 11:12

## 2024-12-19 RX ADMIN — MORPHINE SULFATE 4 MG: 10 INJECTION INTRAVENOUS at 01:12

## 2024-12-19 RX ADMIN — FENTANYL CITRATE 100 MCG: 50 INJECTION, SOLUTION INTRAMUSCULAR; INTRAVENOUS at 10:12

## 2024-12-19 RX ADMIN — HYDROCODONE BITARTRATE AND ACETAMINOPHEN 1 TABLET: 10; 325 TABLET ORAL at 02:12

## 2024-12-19 RX ADMIN — EPHEDRINE SULFATE 15 MG: 50 INJECTION INTRAVENOUS at 11:12

## 2024-12-19 RX ADMIN — MORPHINE SULFATE 2 MG: 10 INJECTION INTRAVENOUS at 01:12

## 2024-12-19 RX ADMIN — ONDANSETRON 4 MG: 2 INJECTION INTRAMUSCULAR; INTRAVENOUS at 11:12

## 2024-12-19 RX ADMIN — ROCURONIUM BROMIDE 15 MG: 10 INJECTION, SOLUTION INTRAVENOUS at 10:12

## 2024-12-19 RX ADMIN — PROPOFOL 170 MG: 10 INJECTION, EMULSION INTRAVENOUS at 10:12

## 2024-12-19 RX ADMIN — ROCURONIUM BROMIDE 20 MG: 10 INJECTION, SOLUTION INTRAVENOUS at 11:12

## 2024-12-19 RX ADMIN — SODIUM CHLORIDE: 9 INJECTION, SOLUTION INTRAVENOUS at 10:12

## 2024-12-19 RX ADMIN — CEFAZOLIN 2 G: 1 INJECTION, POWDER, FOR SOLUTION INTRAMUSCULAR; INTRAVENOUS; PARENTERAL at 10:12

## 2024-12-19 NOTE — OP NOTE
[facility]     Operative Note    SUMMARY     Date of Procedure: 12/19/2024     Procedure: Procedure(s) (LRB):  REPAIR, HERNIA, UMBILICAL (N/A)     Surgeons and Role:     * Amrit Watts MD - Primary    Assisting Surgeon: None    Pre-Operative Diagnosis: Umbilical hernia without obstruction and without gangrene [K42.9]    Post-Operative Diagnosis: Post-Op Diagnosis Codes:     * Umbilical hernia without obstruction and without gangrene [K42.9]    Anesthesia: General    Assistant:  None    Findings:   There was a 6 cm fascial defect deep to and cephalad to the umbilicus through which was herniated a moderate amount of omentum and intestinal content.  The defect was repaired with a 9 cm Symbotex mesh patch placed in preperitoneal position in Stoppa technique.  Fascial edges were reapproximated superficial to the mesh after mesh placement.      Description of procedure in detail:  The patient was brought to the operating room and placed in supine position. General anesthesia was administered. The abdomen was prepped and draped in standard fashion.  Incision was then performed overlying the bulge cephalad to the umbilicus. It was extended slightly cephalad and caudad. Dissection was carried through the subcutaneous tissue to herniated preperitoneal fat and omentum.  The fascial edges were identified and circumferentially cleared of omentum and preperitoneal fat.  The hernia sac was then dissected back to the fascial edges.  Hernia sac was entered, during this dissection.  Continued dissection was performed in the preperitoneal space to create space for the 9 cm mesh patch.  Once was enough space, the peritoneal sac was closed by reapproximating the edges with Vicryl.  The mesh was then placed and sutured circumferentially with continuous running 0 PDS suture.  Interrupted Ethibond was then placed in horizontal mattress fashion. The defect was then closed using interrupted Ethibond suture  in horizontal mattress fashion.  Continuous running Ethibond was then utilized as well.  Subcutaneous tissues were then irrigated with saline solution.  The soft tissue and deep dermis was reapproximated with Vicryl suture in a subdermal position. Continuous running Vicryl was used to reapproximate skin subcuticular position. Steri-Strips were placed and sterile dressings were applied. The patient was awakened from anesthesia in stable condition.    Description of the Findings of the Procedure:  6cm fascial defect; 9cm symbotex mesh patch, Stoppa technique    Assistant(s):       Complications: No    Estimated Blood Loss (EBL):10cc           Implants:   Implant Name Type Inv. Item Serial No.  Lot No. LRB No. Used Action   MESH SYMBOTEX AMOR RND 9CM - [id number]  MESH SYMBOTEX AMOR RND 9CM  [employer] [facility] [id number] N/A 1 Implanted       Specimens:   Specimen (24h ago, onward)      None             Condition: Good      Complications:  None

## 2024-12-19 NOTE — PLAN OF CARE
1252 pt to pacu pt resp reg and non labored pt sleeping pt sao2 96% on 7l fm dsg d/I to abd, abd binder intact pt pain level 0 at present will monitor    1315 pt c/o pain in abd pt pain level 8     1318 pt given morphine 4mg ivp for pain control will titrate as needed    1330 pt vs stable pt resting pt sao2 94% on ra dsg d/I to abd     1345  pt sr per scope pt sao2 94% on ra pt dsg d/I to abd,,  abd binder intact pt pain level 4-5 pt dozzing at times orders to release to room per md    1400 pt released to K Judzinski rn b/p 183/88 pulse 71 resp 16 sao2 96% on ra pt awake family at bedside

## 2024-12-19 NOTE — TRANSFER OF CARE
"Anesthesia Transfer of Care Note    Patient: Joey Hughes    Procedure(s) Performed: Procedure(s) (LRB):  REPAIR, HERNIA, UMBILICAL (N/A)    Patient location: PACU    Transport from OR: Transported from OR on room air with adequate spontaneous ventilation    Post pain: adequate analgesia    Post assessment: no apparent anesthetic complications    Post vital signs: stable    Level of consciousness: awake, alert and oriented    Nausea/Vomiting: no nausea/vomiting    Complications: none    Transfer of care protocol was followed      Last vitals: Visit Vitals  BP (!) 165/84   Pulse 72   Temp 36.5 °C (97.7 °F) (Oral)   Resp 16   Ht 5' 9" (1.753 m)   Wt 99.8 kg (220 lb)   SpO2 96%   BMI 32.49 kg/m²     "

## 2024-12-19 NOTE — ANESTHESIA PREPROCEDURE EVALUATION
12/19/2024  Joey Hughes is a 69 y.o., male.      Pre-op Assessment    I have reviewed the Patient Summary Reports.    I have reviewed the NPO Status.   I have reviewed the Medications.     Review of Systems         Anesthesia Plan  Type of Anesthesia, risks & benefits discussed:    Anesthesia Type: Gen ETT  Intra-op Monitoring Plan: Standard ASA Monitors  Post Op Pain Control Plan: IV/PO Opioids PRN  Induction:  IV  Informed Consent: Informed consent signed with the Patient and all parties understand the risks and agree with anesthesia plan.  All questions answered.   ASA Score: 2    Ready For Surgery From Anesthesia Perspective.     .  NPO greater than 8 hours  No anesthetic complications  NKDA    Hct 41  12/4/24 EKG: Sinus bradycardia; 59 bpm    Pre-diabetes  HTN    MP II; adequate ROM at neck

## 2024-12-19 NOTE — ANESTHESIA PROCEDURE NOTES
Intubation    Date/Time: 12/19/2024 10:47 AM    Performed by: Ofelia Stock CRNA  Authorized by: Dane Apodaca MD    Intubation:     Induction:  Intravenous    Intubated:  Postinduction    Mask Ventilation:  N/a    Attempts:  1    Attempted By:  CRNA    Difficult Airway Encountered?: No      Complications:  None    Airway Device:  Supraglottic airway/LMA    Airway Device Size:  4.0    Style/Cuff Inflation:  Cuffed    Secured at:  The lips    Placement Verified By:  Capnometry    Complicating Factors:  None    Findings Post-Intubation:  BS equal bilateral and atraumatic/condition of teeth unchanged

## 2024-12-20 NOTE — ANESTHESIA POSTPROCEDURE EVALUATION
Anesthesia Post Evaluation    Patient: Joey Hughes    Procedure(s) Performed: Procedure(s) (LRB):  REPAIR, HERNIA, UMBILICAL (N/A)    Final Anesthesia Type: general      Patient location during evaluation: PACU  Post-procedure vital signs: reviewed and stable  Pain management: adequate  Airway patency: patent    PONV status at discharge: No PONV  Anesthetic complications: no      Cardiovascular status: hemodynamically stable  Respiratory status: unassisted  Hydration status: euvolemic  Follow-up not needed.              Vitals Value Taken Time   /89 12/19/24 1515   Temp 36.5 °C (97.7 °F) 12/19/24 1252   Pulse 79 12/19/24 1523   Resp 16 12/19/24 1515   SpO2 97 % 12/19/24 1523   Vitals shown include unfiled device data.      Event Time   Out of Recovery 13:45:00         Pain/Alok Score: Pain Rating Prior to Med Admin: 7 (12/19/2024  2:09 PM)  Alok Score: 10 (12/19/2024  4:00 PM)

## 2024-12-20 NOTE — DISCHARGE SUMMARY
[facility]  Discharge Note  Short Stay    Procedure(s) (LRB):  REPAIR, HERNIA, UMBILICAL (N/A)      OUTCOME: Patient tolerated treatment/procedure well without complication and is now ready for discharge.    DISPOSITION: Home or Self Care    FINAL DIAGNOSIS:  Umbilical hernia without obstruction and without gangrene    FOLLOWUP: In clinic    DISCHARGE INSTRUCTIONS:    Discharge Procedure Orders   Diet general     Remove dressing in 48 hours   Order Comments: Leave steristrips on     Call MD for:  temperature >100.4     Call MD for:  persistent nausea and vomiting     Call MD for:  severe uncontrolled pain     Call MD for:  difficulty breathing, headache or visual disturbances     Shower on day dressing removed (No bath)         Clinical Reference Documents Added to Patient Instructions         Document    ABDOMINAL HERNIA REPAIR DISCHARGE INSTRUCTIONS (ENGLISH)            TIME SPENT ON DISCHARGE: 5 minutes

## 2024-12-31 ENCOUNTER — OFFICE VISIT (OUTPATIENT)
Dept: SURGERY | Facility: CLINIC | Age: 69
End: 2024-12-31
Attending: SURGERY
Payer: COMMERCIAL

## 2024-12-31 VITALS — WEIGHT: 218.25 LBS | HEIGHT: 69 IN | BODY MASS INDEX: 32.33 KG/M2

## 2024-12-31 DIAGNOSIS — Z09 POSTOP CHECK: Primary | ICD-10-CM

## 2024-12-31 PROCEDURE — 3044F HG A1C LEVEL LT 7.0%: CPT | Mod: CPTII,,, | Performed by: SURGERY

## 2024-12-31 PROCEDURE — 99213 OFFICE O/P EST LOW 20 MIN: CPT | Mod: PBBFAC | Performed by: SURGERY

## 2024-12-31 PROCEDURE — 3066F NEPHROPATHY DOC TX: CPT | Mod: CPTII,,, | Performed by: SURGERY

## 2024-12-31 PROCEDURE — 99999 PR PBB SHADOW E&M-EST. PATIENT-LVL III: CPT | Mod: PBBFAC,,, | Performed by: SURGERY

## 2024-12-31 PROCEDURE — 99212 OFFICE O/P EST SF 10 MIN: CPT | Mod: ,,, | Performed by: SURGERY

## 2024-12-31 PROCEDURE — 3061F NEG MICROALBUMINURIA REV: CPT | Mod: CPTII,,, | Performed by: SURGERY

## 2024-12-31 PROCEDURE — 1159F MED LIST DOCD IN RCRD: CPT | Mod: CPTII,,, | Performed by: SURGERY

## 2024-12-31 NOTE — PROGRESS NOTES
Patient returns after having an umbilical hernia repair.  He reports no problems.      On exam, the patient has evidence of seroma.  The incision is healing well, however.      Patient instructed as to activity going forward.    Follow up with me PRN.

## 2025-01-28 ENCOUNTER — OFFICE VISIT (OUTPATIENT)
Dept: FAMILY MEDICINE | Facility: CLINIC | Age: 70
End: 2025-01-28
Payer: MEDICARE

## 2025-01-28 VITALS
BODY MASS INDEX: 33.13 KG/M2 | SYSTOLIC BLOOD PRESSURE: 158 MMHG | OXYGEN SATURATION: 100 % | RESPIRATION RATE: 16 BRPM | HEIGHT: 69 IN | TEMPERATURE: 98 F | HEART RATE: 84 BPM | DIASTOLIC BLOOD PRESSURE: 89 MMHG | WEIGHT: 223.69 LBS

## 2025-01-28 DIAGNOSIS — Z23 TETANUS-DIPHTHERIA VACCINATION ADMINISTERED AT CURRENT VISIT: ICD-10-CM

## 2025-01-28 DIAGNOSIS — S60.411A ABRASION OF LEFT INDEX FINGER, INITIAL ENCOUNTER: ICD-10-CM

## 2025-01-28 DIAGNOSIS — R73.03 PRE-DIABETES: ICD-10-CM

## 2025-01-28 DIAGNOSIS — I10 HYPERTENSION, UNSPECIFIED TYPE: ICD-10-CM

## 2025-01-28 DIAGNOSIS — I10 PRIMARY HYPERTENSION: Primary | ICD-10-CM

## 2025-01-28 DIAGNOSIS — E78.5 HYPERLIPIDEMIA, UNSPECIFIED HYPERLIPIDEMIA TYPE: ICD-10-CM

## 2025-01-28 LAB
ALBUMIN SERPL BCP-MCNC: 4.2 G/DL (ref 3.4–4.8)
ALBUMIN/GLOB SERPL: 1.4 {RATIO}
ALP SERPL-CCNC: 61 U/L (ref 40–150)
ALT SERPL W P-5'-P-CCNC: 28 U/L
ANION GAP SERPL CALCULATED.3IONS-SCNC: 12 MMOL/L (ref 7–16)
AST SERPL W P-5'-P-CCNC: 28 U/L (ref 5–34)
BILIRUB SERPL-MCNC: 0.5 MG/DL
BUN SERPL-MCNC: 14 MG/DL (ref 8–26)
BUN/CREAT SERPL: 12 (ref 6–20)
CALCIUM SERPL-MCNC: 9.7 MG/DL (ref 8.8–10)
CHLORIDE SERPL-SCNC: 104 MMOL/L (ref 98–107)
CHOLEST SERPL-MCNC: 188 MG/DL
CHOLEST/HDLC SERPL: 3.5 {RATIO}
CO2 SERPL-SCNC: 26 MMOL/L (ref 23–31)
CREAT SERPL-MCNC: 1.18 MG/DL (ref 0.72–1.25)
EGFR (NO RACE VARIABLE) (RUSH/TITUS): 68 ML/MIN/1.73M2
EST. AVERAGE GLUCOSE BLD GHB EST-MCNC: 131 MG/DL
GLOBULIN SER-MCNC: 3 G/DL (ref 2–4)
GLUCOSE SERPL-MCNC: 120 MG/DL (ref 82–115)
HBA1C MFR BLD HPLC: 6.2 %
HDLC SERPL-MCNC: 54 MG/DL (ref 35–60)
LDLC SERPL CALC-MCNC: 119 MG/DL
LDLC/HDLC SERPL: 2.2 {RATIO}
NONHDLC SERPL-MCNC: 134 MG/DL
POTASSIUM SERPL-SCNC: 3.9 MMOL/L (ref 3.5–5.1)
PROT SERPL-MCNC: 7.2 G/DL (ref 5.8–7.6)
SODIUM SERPL-SCNC: 138 MMOL/L (ref 136–145)
TRIGL SERPL-MCNC: 77 MG/DL (ref 34–140)
VLDLC SERPL-MCNC: 15 MG/DL

## 2025-01-28 PROCEDURE — 83036 HEMOGLOBIN GLYCOSYLATED A1C: CPT | Mod: ,,, | Performed by: CLINICAL MEDICAL LABORATORY

## 2025-01-28 PROCEDURE — 80061 LIPID PANEL: CPT | Mod: ,,, | Performed by: CLINICAL MEDICAL LABORATORY

## 2025-01-28 PROCEDURE — 3077F SYST BP >= 140 MM HG: CPT | Mod: ,,, | Performed by: NURSE PRACTITIONER

## 2025-01-28 PROCEDURE — 90715 TDAP VACCINE 7 YRS/> IM: CPT | Mod: ,,, | Performed by: NURSE PRACTITIONER

## 2025-01-28 PROCEDURE — 1159F MED LIST DOCD IN RCRD: CPT | Mod: ,,, | Performed by: NURSE PRACTITIONER

## 2025-01-28 PROCEDURE — 1160F RVW MEDS BY RX/DR IN RCRD: CPT | Mod: ,,, | Performed by: NURSE PRACTITIONER

## 2025-01-28 PROCEDURE — 3008F BODY MASS INDEX DOCD: CPT | Mod: ,,, | Performed by: NURSE PRACTITIONER

## 2025-01-28 PROCEDURE — 90471 IMMUNIZATION ADMIN: CPT | Mod: ,,, | Performed by: NURSE PRACTITIONER

## 2025-01-28 PROCEDURE — 80053 COMPREHEN METABOLIC PANEL: CPT | Mod: ,,, | Performed by: CLINICAL MEDICAL LABORATORY

## 2025-01-28 PROCEDURE — 3079F DIAST BP 80-89 MM HG: CPT | Mod: ,,, | Performed by: NURSE PRACTITIONER

## 2025-01-28 PROCEDURE — 3288F FALL RISK ASSESSMENT DOCD: CPT | Mod: ,,, | Performed by: NURSE PRACTITIONER

## 2025-01-28 PROCEDURE — 99214 OFFICE O/P EST MOD 30 MIN: CPT | Mod: 25,,, | Performed by: NURSE PRACTITIONER

## 2025-01-28 PROCEDURE — 1101F PT FALLS ASSESS-DOCD LE1/YR: CPT | Mod: ,,, | Performed by: NURSE PRACTITIONER

## 2025-01-28 RX ORDER — ROSUVASTATIN CALCIUM 5 MG/1
5 TABLET, COATED ORAL DAILY
Qty: 90 TABLET | Refills: 1 | Status: SHIPPED | OUTPATIENT
Start: 2025-01-28

## 2025-01-28 RX ORDER — HYDRALAZINE HYDROCHLORIDE 100 MG/1
100 TABLET, FILM COATED ORAL 2 TIMES DAILY
Qty: 180 TABLET | Refills: 1 | Status: SHIPPED | OUTPATIENT
Start: 2025-01-28

## 2025-01-28 RX ORDER — VALSARTAN AND HYDROCHLOROTHIAZIDE 320; 25 MG/1; MG/1
1 TABLET, FILM COATED ORAL DAILY
Qty: 90 TABLET | Refills: 1 | Status: SHIPPED | OUTPATIENT
Start: 2025-01-28

## 2025-01-28 RX ORDER — METOPROLOL SUCCINATE 25 MG/1
25 TABLET, EXTENDED RELEASE ORAL DAILY
Qty: 90 TABLET | Refills: 1 | Status: SHIPPED | OUTPATIENT
Start: 2025-01-28

## 2025-01-28 NOTE — PROGRESS NOTES
BRUNO Cohn   [facility] [address] [place] [address]  [phone]      PATIENT NAME: Joey Hughes  : 1955  DATE: 25  MRN: [id number]      Billing Provider: BRUNO Cohn  Level of Service:   Patient PCP Information       Provider PCP Type    BRUNO Cohn General            Reason for Visit / Chief Complaint: Follow-up, Hypertension, and Hyperlipidemia (3 mos)       Update PCP  Update Chief Complaint         History of Present Illness / Problem Focused Workflow     Joey Hughes presents to the clinic with Follow-up, Hypertension, and Hyperlipidemia (3 mos)     Pt presents for routine follow up with lab and med refills. Overall doing well.      BP appears not at goal today. Home meds reviewed    The current medical regimen is effective;  continue present plan and medications. Recommended patient to check home readings to monitor and see me for followup as scheduled or sooner as needed.   Discussed sodium restriction, maintaining ideal body weight and regular exercise program as physiologic means to continue to achieve blood pressure control in addition to medication compliance.  Patient was educated that both decongestant and anti-inflammatory medication may raise blood pressure.    Advised to monitor BP at home. Advised on optimal BP readings - SBP < 130 & DBP < 80. Advised to call office for any persistent BP elevation and may have to prescribe or adjust BP med(s).  Recommended DASH diet, stay well hydrated with water daily, eliminate or decrease caffeinated and high calorie drinks, increase physical activity, and lose weight if BMI > 25.0. Written patient education information provided to patient with goals and recommendations to assist with BP management.     Discussed need for low fat/low cholesterol diet, regular exercise, and weight control.   Cardiovascular risk and  specific lipid/LDL goals reviewed.        Review of Systems     Review of Systems   Constitutional:  Negative for fatigue and fever.   HENT:  Negative for nasal congestion and sore throat.    Eyes:  Negative for visual disturbance.   Respiratory:  Negative for chest tightness and shortness of breath.    Cardiovascular:  Negative for chest pain and leg swelling.   Gastrointestinal:  Negative for abdominal pain and change in bowel habit.   Endocrine: Negative for polydipsia, polyphagia and polyuria.   Genitourinary:  Negative for dysuria and hematuria.   Musculoskeletal:  Negative for back pain and leg pain.   Integumentary:  Negative for rash.   Neurological:  Negative for dizziness, syncope, weakness and light-headedness.        Medical / Social / Family History     Past Medical History:   Diagnosis Date    Arthritis     High cholesterol     Hypertension        Past Surgical History:   Procedure Laterality Date    COLONOSCOPY      REPAIR, HERNIA, UMBILICAL N/A 12/19/2024    Procedure: REPAIR, HERNIA, UMBILICAL;  Surgeon: Amrit Watts MD;  Location: [facility];  Service: General;  Laterality: N/A;       Social History  Mr. Hughes  reports that he has never smoked. He has never used smokeless tobacco. He reports current alcohol use of about 7.0 standard drinks of alcohol per week. He reports that he does not use drugs.    Family History  Mr. Hughes's family history includes Cancer in his mother; Diabetes in his brother and brother.    Medications and Allergies     Medications  Outpatient Medications Marked as Taking for the 1/28/25 encounter (Office Visit) with Debbie Smith ACNP   Medication Sig Dispense Refill    diclofenac (VOLTAREN) 50 MG EC tablet Take 1 tablet (50 mg total) by mouth 2 (two) times daily as needed (arthritis). 60 tablet 1    mv, min #36-iron,carbonyl-FA (GERITOL COMPLETE) 16 mg iron- 0.38 mg Tab Take 1 tablet by mouth once daily.         Allergies  Review of patient's allergies  indicates:  No Known Allergies    Physical Examination     Vitals:    01/28/25 1320   BP: (!) 158/89   Pulse:    Resp:    Temp:      Physical Exam  Eyes:      Pupils: Pupils are equal, round, and reactive to light.   Cardiovascular:      Rate and Rhythm: Normal rate and regular rhythm.      Heart sounds: Normal heart sounds. No murmur heard.  Pulmonary:      Breath sounds: Normal breath sounds. No wheezing, rhonchi or rales.   Abdominal:      General: Bowel sounds are normal.      Palpations: Abdomen is soft.   Musculoskeletal:         General: No swelling.      Cervical back: Normal range of motion and neck supple.   Skin:     General: Skin is warm and dry.   Neurological:      Mental Status: He is alert and oriented to person, place, and time.          Lab Results   Component Value Date    WBC 5.90 12/04/2024    HGB 13.4 (L) 12/04/2024    HCT 41.4 12/04/2024    MCV 88.8 12/04/2024     12/04/2024        Sodium   Date Value Ref Range Status   12/04/2024 140 136 - 145 mmol/L Final     Potassium   Date Value Ref Range Status   12/04/2024 3.9 3.5 - 5.1 mmol/L Final     Chloride   Date Value Ref Range Status   12/04/2024 105 98 - 107 mmol/L Final     CO2   Date Value Ref Range Status   12/04/2024 25 23 - 31 mmol/L Final     Glucose   Date Value Ref Range Status   12/04/2024 120 (H) 82 - 115 mg/dL Final     BUN   Date Value Ref Range Status   12/04/2024 14 8 - 26 mg/dL Final     Creatinine   Date Value Ref Range Status   12/04/2024 0.94 0.72 - 1.25 mg/dL Final     Calcium   Date Value Ref Range Status   12/04/2024 9.0 8.8 - 10.0 mg/dL Final     Total Protein   Date Value Ref Range Status   10/28/2024 7.4 6.4 - 8.2 g/dL Final     Albumin   Date Value Ref Range Status   10/28/2024 3.9 3.5 - 5.0 g/dL Final     Bilirubin, Total   Date Value Ref Range Status   10/28/2024 0.5 >0.0 - 1.2 mg/dL Final     Alk Phos   Date Value Ref Range Status   10/28/2024 57 45 - 115 U/L Final     AST   Date Value Ref Range Status  "  10/28/2024 26 15 - 37 U/L Final     ALT   Date Value Ref Range Status   10/28/2024 40 16 - 61 U/L Final     Anion Gap   Date Value Ref Range Status   12/04/2024 14 7 - 16 mmol/L Final     eGFR   Date Value Ref Range Status   12/04/2024 89 >=60 mL/min/1.73m2 Final      Lab Results   Component Value Date    HGBA1C 6.2 10/28/2024      Lab Results   Component Value Date    CHOL 187 10/28/2024    CHOL 197 07/25/2024    CHOL 221 (H) 02/27/2024     Lab Results   Component Value Date    HDL 59 10/28/2024    HDL 55 07/25/2024    HDL 68 (H) 02/27/2024     Lab Results   Component Value Date    LDLCALC 100 10/28/2024    LDLCALC 113 07/25/2024    LDLCALC 124 02/27/2024     No results found for: "DLDL"  Lab Results   Component Value Date    TRIG 140 10/28/2024    TRIG 145 07/25/2024    TRIG 143 02/27/2024     Lab Results   Component Value Date    CHOLHDL 3.2 10/28/2024    CHOLHDL 3.6 07/25/2024    CHOLHDL 3.3 02/27/2024      Lab Results   Component Value Date    TSH 1.100 02/16/2022        Assessment and Plan (including Health Maintenance)      Problem List  Smart Sets  Document Outside HM   :    Plan:     1. Primary hypertension  Assessment & Plan:  BP Readings from Last 3 Encounters:   01/28/25 (!) 158/89   12/19/24 (!) 167/89   10/28/24 123/77    Encouraged home monitoring  Goal less 130/80    Orders:  -     Comprehensive Metabolic Panel; Future; Expected date: 01/28/2025  -     valsartan-hydrochlorothiazide (DIOVAN-HCT) 320-25 mg per tablet; Take 1 tablet by mouth once daily.  Dispense: 90 tablet; Refill: 1    2. Hyperlipidemia, unspecified hyperlipidemia type  Assessment & Plan:  Lab Results   Component Value Date    CHOL 187 10/28/2024    CHOL 197 07/25/2024    CHOL 221 (H) 02/27/2024     Lab Results   Component Value Date    HDL 59 10/28/2024    HDL 55 07/25/2024    HDL 68 (H) 02/27/2024     Lab Results   Component Value Date    LDLCALC 100 10/28/2024    LDLCALC 113 07/25/2024    LDLCALC 124 02/27/2024     Lab Results "   Component Value Date    TRIG 140 10/28/2024    TRIG 145 07/25/2024    TRIG 143 02/27/2024       Lab Results   Component Value Date    CHOLHDL 3.2 10/28/2024    CHOLHDL 3.6 07/25/2024    CHOLHDL 3.3 02/27/2024    The current medical regimen is effective;  continue present plan and medications.      Orders:  -     Lipid Panel; Future; Expected date: 01/28/2025  -     rosuvastatin (CRESTOR) 5 MG tablet; Take 1 tablet (5 mg total) by mouth once daily.  Dispense: 90 tablet; Refill: 1    3. Pre-diabetes  -     Hemoglobin A1C; Future; Expected date: 01/28/2025    4. Hypertension, unspecified type  Assessment & Plan:  BP Readings from Last 3 Encounters:   01/28/25 (!) 158/89   12/19/24 (!) 167/89   10/28/24 123/77    Encouraged home monitoring  Goal less 130/80    Orders:  -     metoprolol succinate (TOPROL-XL) 25 MG 24 hr tablet; Take 1 tablet (25 mg total) by mouth once daily.  Dispense: 90 tablet; Refill: 1    Other orders  -     hydrALAZINE (APRESOLINE) 100 MG tablet; Take 1 tablet (100 mg total) by mouth 2 (two) times daily.  Dispense: 180 tablet; Refill: 1         There are no Patient Instructions on file for this visit.     Health Maintenance Due   Topic Date Due    TETANUS VACCINE  Never done    Shingles Vaccine (1 of 2) Never done         Health Maintenance Topics with due status: Not Due       Topic Last Completion Date    Colorectal Cancer Screening 02/22/2023    PROSTATE-SPECIFIC ANTIGEN 02/27/2024    Diabetes Urine Screening 07/25/2024    Lipid Panel 10/28/2024    Hemoglobin A1c 10/28/2024       Future Appointments   Date Time Provider Department Center   4/28/2025  1:20 PM Debbie Smith ACNP [facility]   10/7/2025 11:00 AM AWV NURSE, [facility]            Signature:  BRUNO Cohn  [facility] [address] [place] [address]  [phone]    Date of  encounter: 1/28/25

## 2025-01-28 NOTE — ASSESSMENT & PLAN NOTE
BP Readings from Last 3 Encounters:   01/28/25 (!) 158/89   12/19/24 (!) 167/89   10/28/24 123/77    Encouraged home monitoring  Goal less 130/80

## 2025-01-28 NOTE — ASSESSMENT & PLAN NOTE
Lab Results   Component Value Date    CHOL 187 10/28/2024    CHOL 197 07/25/2024    CHOL 221 (H) 02/27/2024     Lab Results   Component Value Date    HDL 59 10/28/2024    HDL 55 07/25/2024    HDL 68 (H) 02/27/2024     Lab Results   Component Value Date    LDLCALC 100 10/28/2024    LDLCALC 113 07/25/2024    LDLCALC 124 02/27/2024     Lab Results   Component Value Date    TRIG 140 10/28/2024    TRIG 145 07/25/2024    TRIG 143 02/27/2024       Lab Results   Component Value Date    CHOLHDL 3.2 10/28/2024    CHOLHDL 3.6 07/25/2024    CHOLHDL 3.3 02/27/2024    The current medical regimen is effective;  continue present plan and medications.

## 2025-01-30 NOTE — ADDENDUM NOTE
Addended by: ROBERTA BOYD on: 1/30/2025 09:12 AM     Modules accepted: Orders    
89-year-old female PMH AS status post TAVR, hypertension, hyperlipidemia, GERD, CVA, multiple TIAs in the past presenting to ED with room spinning dizziness/aphasia that started approximately around 2 PM while in route to physical therapy with her daughter  CT imaging negative. MRI obtained overnight showing no acute infarct but ? venous thrombosis.  "Right cerebellar remote infarction, Multiple small basal ganglia thalamic remote infarctions, Ischemic white matter disease lower range typical for age, Diffuse brain volume loss typical for age, Suspect subocclusive thrombosis left distal transverse and sigmoid sinus. recommended CTVs to evaluate as don't think patient presentation is consistent with Venous sinus thrombosis, and if CTVs negative patient cleared for dc. patient CTVs negative, neuro aware and cleared patient for dc. seen by PT safe for dispo, neuro rec plavix x 3 weeks and asa daily. Discussion at bedside with patient and patient daughter, all understand and ok with discharge plan.

## 2025-04-28 ENCOUNTER — OFFICE VISIT (OUTPATIENT)
Dept: FAMILY MEDICINE | Facility: CLINIC | Age: 70
End: 2025-04-28
Payer: MEDICARE

## 2025-04-28 VITALS
SYSTOLIC BLOOD PRESSURE: 122 MMHG | OXYGEN SATURATION: 97 % | TEMPERATURE: 98 F | WEIGHT: 219.63 LBS | HEIGHT: 69 IN | RESPIRATION RATE: 18 BRPM | DIASTOLIC BLOOD PRESSURE: 70 MMHG | HEART RATE: 93 BPM | BODY MASS INDEX: 32.53 KG/M2

## 2025-04-28 DIAGNOSIS — Z12.5 PROSTATE CANCER SCREENING: ICD-10-CM

## 2025-04-28 DIAGNOSIS — R73.03 PRE-DIABETES: ICD-10-CM

## 2025-04-28 DIAGNOSIS — I10 PRIMARY HYPERTENSION: ICD-10-CM

## 2025-04-28 DIAGNOSIS — E78.5 HYPERLIPIDEMIA, UNSPECIFIED HYPERLIPIDEMIA TYPE: Primary | ICD-10-CM

## 2025-04-28 PROBLEM — R73.01 IMPAIRED FASTING GLUCOSE: Status: RESOLVED | Noted: 2022-06-09 | Resolved: 2025-04-28

## 2025-04-28 LAB
ALBUMIN SERPL BCP-MCNC: 4.3 G/DL (ref 3.4–4.8)
ALBUMIN/GLOB SERPL: 1.2 {RATIO}
ALP SERPL-CCNC: 57 U/L (ref 40–150)
ALT SERPL W P-5'-P-CCNC: 22 U/L
ANION GAP SERPL CALCULATED.3IONS-SCNC: 15 MMOL/L (ref 7–16)
AST SERPL W P-5'-P-CCNC: 28 U/L (ref 11–45)
BILIRUB SERPL-MCNC: 0.5 MG/DL
BUN SERPL-MCNC: 22 MG/DL (ref 8–26)
BUN/CREAT SERPL: 18 (ref 6–20)
CALCIUM SERPL-MCNC: 10.1 MG/DL (ref 8.8–10)
CHLORIDE SERPL-SCNC: 102 MMOL/L (ref 98–107)
CHOLEST SERPL-MCNC: 214 MG/DL
CHOLEST/HDLC SERPL: 4.5 {RATIO}
CO2 SERPL-SCNC: 26 MMOL/L (ref 23–31)
CREAT SERPL-MCNC: 1.24 MG/DL (ref 0.72–1.25)
EGFR (NO RACE VARIABLE) (RUSH/TITUS): 64 ML/MIN/1.73M2
EST. AVERAGE GLUCOSE BLD GHB EST-MCNC: 131 MG/DL
GLOBULIN SER-MCNC: 3.6 G/DL (ref 2–4)
GLUCOSE SERPL-MCNC: 131 MG/DL (ref 82–115)
HBA1C MFR BLD HPLC: 6.2 %
HDLC SERPL-MCNC: 48 MG/DL (ref 35–60)
LDLC SERPL CALC-MCNC: 143 MG/DL
LDLC/HDLC SERPL: 3 {RATIO}
NONHDLC SERPL-MCNC: 166 MG/DL
POTASSIUM SERPL-SCNC: 4.4 MMOL/L (ref 3.5–5.1)
PROT SERPL-MCNC: 7.9 G/DL (ref 5.8–7.6)
PSA SERPL-MCNC: 0.16 NG/ML
SODIUM SERPL-SCNC: 139 MMOL/L (ref 136–145)
TRIGL SERPL-MCNC: 115 MG/DL (ref 34–140)
VLDLC SERPL-MCNC: 23 MG/DL

## 2025-04-28 PROCEDURE — 83036 HEMOGLOBIN GLYCOSYLATED A1C: CPT | Mod: ,,, | Performed by: CLINICAL MEDICAL LABORATORY

## 2025-04-28 PROCEDURE — 1101F PT FALLS ASSESS-DOCD LE1/YR: CPT | Mod: ,,, | Performed by: NURSE PRACTITIONER

## 2025-04-28 PROCEDURE — 3078F DIAST BP <80 MM HG: CPT | Mod: ,,, | Performed by: NURSE PRACTITIONER

## 2025-04-28 PROCEDURE — 1159F MED LIST DOCD IN RCRD: CPT | Mod: ,,, | Performed by: NURSE PRACTITIONER

## 2025-04-28 PROCEDURE — 80053 COMPREHEN METABOLIC PANEL: CPT | Mod: ,,, | Performed by: CLINICAL MEDICAL LABORATORY

## 2025-04-28 PROCEDURE — 80061 LIPID PANEL: CPT | Mod: ,,, | Performed by: CLINICAL MEDICAL LABORATORY

## 2025-04-28 PROCEDURE — 3008F BODY MASS INDEX DOCD: CPT | Mod: ,,, | Performed by: NURSE PRACTITIONER

## 2025-04-28 PROCEDURE — G0103 PSA SCREENING: HCPCS | Mod: ,,, | Performed by: CLINICAL MEDICAL LABORATORY

## 2025-04-28 PROCEDURE — 3044F HG A1C LEVEL LT 7.0%: CPT | Mod: ,,, | Performed by: NURSE PRACTITIONER

## 2025-04-28 PROCEDURE — 3074F SYST BP LT 130 MM HG: CPT | Mod: ,,, | Performed by: NURSE PRACTITIONER

## 2025-04-28 PROCEDURE — 99214 OFFICE O/P EST MOD 30 MIN: CPT | Mod: ,,, | Performed by: NURSE PRACTITIONER

## 2025-04-28 PROCEDURE — 1160F RVW MEDS BY RX/DR IN RCRD: CPT | Mod: ,,, | Performed by: NURSE PRACTITIONER

## 2025-04-28 PROCEDURE — 3288F FALL RISK ASSESSMENT DOCD: CPT | Mod: ,,, | Performed by: NURSE PRACTITIONER

## 2025-04-28 RX ORDER — ROSUVASTATIN CALCIUM 20 MG/1
20 TABLET, COATED ORAL DAILY
Qty: 90 TABLET | Refills: 1 | Status: SHIPPED | OUTPATIENT
Start: 2025-04-28 | End: 2026-04-28

## 2025-04-28 NOTE — PROGRESS NOTES
BRUNO Cohn   [facility] [address] [place] [address]  [phone]      PATIENT NAME: Joey Hughes  : 1955  DATE: 25  MRN: [id number]      Billing Provider: BRUNO Cohn  Level of Service:   Patient PCP Information       Provider PCP Type    BRUNO Cohn General            Reason for Visit / Chief Complaint: Follow-up (..PROSTATE-SPECIFIC ANTIGEN due on 2025 ), Hyperlipidemia, and Hypertension       Update PCP  Update Chief Complaint         History of Present Illness / Problem Focused Workflow     Joey Hughes presents to the clinic with Follow-up (..PROSTATE-SPECIFIC ANTIGEN due on 2025 ), Hyperlipidemia, and Hypertension     Pt presents for routine follow up with lab and med refills. Overall doing well.      BP appears  controlled today. Home meds reviewed    The current medical regimen is effective;  continue present plan and medications. Recommended patient to check home readings to monitor and see me for followup as scheduled or sooner as needed.   Discussed sodium restriction, maintaining ideal body weight and regular exercise program as physiologic means to continue to achieve blood pressure control in addition to medication compliance.  Patient was educated that both decongestant and anti-inflammatory medication may raise blood pressure.    Advised to monitor BP at home. Advised on optimal BP readings - SBP < 130 & DBP < 80. Advised to call office for any persistent BP elevation and may have to prescribe or adjust BP med(s).  Recommended DASH diet, stay well hydrated with water daily, eliminate or decrease caffeinated and high calorie drinks, increase physical activity, and lose weight if BMI > 25.0. Written patient education information provided to patient with goals and recommendations to assist with BP management.     Discussed need for low fat/low  cholesterol diet, regular exercise, and weight control.   Cardiovascular risk and specific lipid/LDL goals reviewed.        Review of Systems     Review of Systems   Constitutional:  Negative for fatigue and fever.   HENT:  Negative for nasal congestion and sore throat.    Eyes:  Negative for visual disturbance.   Respiratory:  Negative for chest tightness and shortness of breath.    Cardiovascular:  Negative for chest pain and leg swelling.   Gastrointestinal:  Negative for abdominal pain and change in bowel habit.   Endocrine: Negative for polydipsia, polyphagia and polyuria.   Genitourinary:  Negative for dysuria and hematuria.   Musculoskeletal:  Negative for back pain and leg pain.   Integumentary:  Negative for rash.   Neurological:  Negative for dizziness, syncope, weakness and light-headedness.        Medical / Social / Family History     Past Medical History:   Diagnosis Date    Arthritis     High cholesterol     Hypertension        Past Surgical History:   Procedure Laterality Date    COLONOSCOPY      REPAIR, HERNIA, UMBILICAL N/A 12/19/2024    Procedure: REPAIR, HERNIA, UMBILICAL;  Surgeon: Amrit Watts MD;  Location: [facility];  Service: General;  Laterality: N/A;       Social History  Mr. Hughes  reports that he has never smoked. He has never used smokeless tobacco. He reports current alcohol use of about 7.0 standard drinks of alcohol per week. He reports that he does not use drugs.    Family History  Mr. Hughes's family history includes Cancer in his mother; Diabetes in his brother and brother.    Medications and Allergies     Medications  Outpatient Medications Marked as Taking for the 4/28/25 encounter (Office Visit) with Debbie Smith ACNP   Medication Sig Dispense Refill    hydrALAZINE (APRESOLINE) 100 MG tablet Take 1 tablet (100 mg total) by mouth 2 (two) times daily. 180 tablet 1    metoprolol succinate (TOPROL-XL) 25 MG 24 hr tablet Take 1 tablet (25 mg total) by mouth once  daily. 90 tablet 1    mv, min #36-iron,carbonyl-FA (GERITOL COMPLETE) 16 mg iron- 0.38 mg Tab Take 1 tablet by mouth once daily.      valsartan-hydrochlorothiazide (DIOVAN-HCT) 320-25 mg per tablet Take 1 tablet by mouth once daily. 90 tablet 1    [DISCONTINUED] rosuvastatin (CRESTOR) 5 MG tablet Take 1 tablet (5 mg total) by mouth once daily. 90 tablet 1       Allergies  Review of patient's allergies indicates:  No Known Allergies    Physical Examination     Vitals:    04/28/25 1341   BP: 122/70   Pulse:    Resp:    Temp:      Physical Exam  Eyes:      Pupils: Pupils are equal, round, and reactive to light.   Cardiovascular:      Rate and Rhythm: Normal rate and regular rhythm.      Heart sounds: Normal heart sounds. No murmur heard.  Pulmonary:      Breath sounds: Normal breath sounds. No wheezing, rhonchi or rales.   Abdominal:      General: Bowel sounds are normal.      Palpations: Abdomen is soft.   Musculoskeletal:         General: No swelling.      Cervical back: Normal range of motion and neck supple.   Skin:     General: Skin is warm and dry.   Neurological:      Mental Status: He is alert and oriented to person, place, and time.          Lab Results   Component Value Date    WBC 5.90 12/04/2024    HGB 13.4 (L) 12/04/2024    HCT 41.4 12/04/2024    MCV 88.8 12/04/2024     12/04/2024        Sodium   Date Value Ref Range Status   01/28/2025 138 136 - 145 mmol/L Final     Potassium   Date Value Ref Range Status   01/28/2025 3.9 3.5 - 5.1 mmol/L Final     Chloride   Date Value Ref Range Status   01/28/2025 104 98 - 107 mmol/L Final     CO2   Date Value Ref Range Status   01/28/2025 26 23 - 31 mmol/L Final     Glucose   Date Value Ref Range Status   01/28/2025 120 (H) 82 - 115 mg/dL Final     BUN   Date Value Ref Range Status   01/28/2025 14 8 - 26 mg/dL Final     Creatinine   Date Value Ref Range Status   01/28/2025 1.18 0.72 - 1.25 mg/dL Final     Calcium   Date Value Ref Range Status   01/28/2025 9.7  "8.8 - 10.0 mg/dL Final     Total Protein   Date Value Ref Range Status   01/28/2025 7.2 5.8 - 7.6 g/dL Final     Albumin   Date Value Ref Range Status   01/28/2025 4.2 3.4 - 4.8 g/dL Final     Bilirubin, Total   Date Value Ref Range Status   01/28/2025 0.5 <=1.5 mg/dL Final     Alk Phos   Date Value Ref Range Status   01/28/2025 61 40 - 150 U/L Final     AST   Date Value Ref Range Status   01/28/2025 28 5 - 34 U/L Final     ALT   Date Value Ref Range Status   01/28/2025 28 <=55 U/L Final     Anion Gap   Date Value Ref Range Status   01/28/2025 12 7 - 16 mmol/L Final     eGFR   Date Value Ref Range Status   01/28/2025 68 >=60 mL/min/1.73m2 Final     Comment:     Estimated GFR calculated using the CKD-EPI creatinine (2021) equation.      Lab Results   Component Value Date    HGBA1C 6.2 01/28/2025      Lab Results   Component Value Date    CHOL 188 01/28/2025    CHOL 187 10/28/2024    CHOL 197 07/25/2024     Lab Results   Component Value Date    HDL 54 01/28/2025    HDL 59 10/28/2024    HDL 55 07/25/2024     Lab Results   Component Value Date    LDLCALC 119 01/28/2025    LDLCALC 100 10/28/2024    LDLCALC 113 07/25/2024     No results found for: "DLDL"  Lab Results   Component Value Date    TRIG 77 01/28/2025    TRIG 140 10/28/2024    TRIG 145 07/25/2024     Lab Results   Component Value Date    CHOLHDL 3.5 01/28/2025    CHOLHDL 3.2 10/28/2024    CHOLHDL 3.6 07/25/2024      Lab Results   Component Value Date    TSH 1.100 02/16/2022        Assessment and Plan (including Health Maintenance)      Problem List  Smart Sets  Document Outside HM   :    Plan:     1. Hyperlipidemia, unspecified hyperlipidemia type  Assessment & Plan:  Lab Results   Component Value Date    CHOL 188 01/28/2025    CHOL 187 10/28/2024    CHOL 197 07/25/2024      Lab Results   Component Value Date    HDL 54 01/28/2025    HDL 59 10/28/2024    HDL 55 07/25/2024      No results found for: "LDL"   Lab Results   Component Value Date    TRIG 77 01/28/2025 " "   TRIG 140 10/28/2024    TRIG 145 07/25/2024      No results found for: "DLDL"       Lab Results   Component Value Date    CHOLHDL 3.5 01/28/2025    CHOLHDL 3.2 10/28/2024    CHOLHDL 3.6 07/25/2024     Cont crestor  Low fat low cholesterol diet    Orders:  -     rosuvastatin (CRESTOR) 20 MG tablet; Take 1 tablet (20 mg total) by mouth once daily.  Dispense: 90 tablet; Refill: 1  -     Lipid Panel; Future; Expected date: 04/28/2025    2. Primary hypertension  Assessment & Plan:  BP Readings from Last 3 Encounters:   04/28/25 122/70   01/28/25 (!) 158/89   12/19/24 (!) 167/89    Better controlled today  Cont hydralazine, metoprolol and valsartan/hctz    Orders:  -     Comprehensive Metabolic Panel; Future; Expected date: 04/28/2025    3. Pre-diabetes  -     Hemoglobin A1C; Future; Expected date: 04/28/2025    4. Prostate cancer screening  -     PSA, Screening; Future; Expected date: 04/28/2025         There are no Patient Instructions on file for this visit.     Health Maintenance Due   Topic Date Due    PROSTATE-SPECIFIC ANTIGEN  02/27/2025         Health Maintenance Topics with due status: Not Due       Topic Last Completion Date    Colorectal Cancer Screening 02/22/2023    Diabetes Urine Screening 07/25/2024    TETANUS VACCINE 01/28/2025    Lipid Panel 01/28/2025    Hemoglobin A1c 01/28/2025       Future Appointments   Date Time Provider Department Center   7/29/2025  1:00 PM Debbie Smith ACNP [facility]   10/7/2025 11:00 AM AWV NURSE, [facility]            Signature:  BRUNO Cohn [facility]  [address] [place] [address] [place] [address]  [phone]    Date of encounter: 4/28/25  "

## 2025-04-28 NOTE — ASSESSMENT & PLAN NOTE
"Lab Results   Component Value Date    CHOL 188 01/28/2025    CHOL 187 10/28/2024    CHOL 197 07/25/2024      Lab Results   Component Value Date    HDL 54 01/28/2025    HDL 59 10/28/2024    HDL 55 07/25/2024      No results found for: "LDL"   Lab Results   Component Value Date    TRIG 77 01/28/2025    TRIG 140 10/28/2024    TRIG 145 07/25/2024      No results found for: "DLDL"       Lab Results   Component Value Date    CHOLHDL 3.5 01/28/2025    CHOLHDL 3.2 10/28/2024    CHOLHDL 3.6 07/25/2024     Cont crestor  Low fat low cholesterol diet  "

## 2025-04-28 NOTE — ASSESSMENT & PLAN NOTE
BP Readings from Last 3 Encounters:   04/28/25 122/70   01/28/25 (!) 158/89   12/19/24 (!) 167/89    Better controlled today  Cont hydralazine, metoprolol and valsartan/hctz

## 2025-04-29 ENCOUNTER — RESULTS FOLLOW-UP (OUTPATIENT)
Dept: FAMILY MEDICINE | Facility: CLINIC | Age: 70
End: 2025-04-29

## 2025-07-28 RX ORDER — VALSARTAN AND HYDROCHLOROTHIAZIDE 320; 25 MG/1; MG/1
1 TABLET, FILM COATED ORAL DAILY
Qty: 90 TABLET | Refills: 1 | Status: CANCELLED | OUTPATIENT
Start: 2025-07-28

## 2025-07-28 RX ORDER — HYDRALAZINE HYDROCHLORIDE 100 MG/1
100 TABLET, FILM COATED ORAL 2 TIMES DAILY
Qty: 180 TABLET | Refills: 1 | Status: CANCELLED | OUTPATIENT
Start: 2025-07-28

## 2025-07-29 ENCOUNTER — OFFICE VISIT (OUTPATIENT)
Dept: FAMILY MEDICINE | Facility: CLINIC | Age: 70
End: 2025-07-29
Payer: MEDICARE

## 2025-07-29 VITALS
BODY MASS INDEX: 32.51 KG/M2 | OXYGEN SATURATION: 99 % | DIASTOLIC BLOOD PRESSURE: 78 MMHG | HEIGHT: 69 IN | SYSTOLIC BLOOD PRESSURE: 169 MMHG | RESPIRATION RATE: 19 BRPM | HEART RATE: 53 BPM | WEIGHT: 219.5 LBS | TEMPERATURE: 98 F

## 2025-07-29 DIAGNOSIS — I10 PRIMARY HYPERTENSION: Primary | ICD-10-CM

## 2025-07-29 DIAGNOSIS — R73.03 PREDIABETES: ICD-10-CM

## 2025-07-29 DIAGNOSIS — E78.5 HYPERLIPIDEMIA, UNSPECIFIED HYPERLIPIDEMIA TYPE: ICD-10-CM

## 2025-07-29 DIAGNOSIS — I10 HYPERTENSION, UNSPECIFIED TYPE: ICD-10-CM

## 2025-07-29 LAB
ALBUMIN SERPL BCP-MCNC: 4.1 G/DL (ref 3.4–4.8)
ALBUMIN/GLOB SERPL: 1.4 {RATIO}
ALP SERPL-CCNC: 49 U/L (ref 40–150)
ALT SERPL W P-5'-P-CCNC: 22 U/L
ANION GAP SERPL CALCULATED.3IONS-SCNC: 11 MMOL/L (ref 7–16)
AST SERPL W P-5'-P-CCNC: 25 U/L (ref 11–45)
BASOPHILS # BLD AUTO: 0.07 K/UL (ref 0–0.2)
BASOPHILS NFR BLD AUTO: 1.2 % (ref 0–1)
BILIRUB SERPL-MCNC: 0.5 MG/DL
BUN SERPL-MCNC: 18 MG/DL (ref 8–26)
BUN/CREAT SERPL: 14 (ref 6–20)
CALCIUM SERPL-MCNC: 9.6 MG/DL (ref 8.8–10)
CHLORIDE SERPL-SCNC: 102 MMOL/L (ref 98–107)
CHOLEST SERPL-MCNC: 177 MG/DL
CHOLEST/HDLC SERPL: 3.5 {RATIO}
CO2 SERPL-SCNC: 29 MMOL/L (ref 23–31)
CREAT SERPL-MCNC: 1.25 MG/DL (ref 0.72–1.25)
CREAT UR-MCNC: 46 MG/DL (ref 23–375)
DIFFERENTIAL METHOD BLD: ABNORMAL
EGFR (NO RACE VARIABLE) (RUSH/TITUS): 63 ML/MIN/1.73M2
EOSINOPHIL # BLD AUTO: 0.08 K/UL (ref 0–0.5)
EOSINOPHIL NFR BLD AUTO: 1.3 % (ref 1–4)
ERYTHROCYTE [DISTWIDTH] IN BLOOD BY AUTOMATED COUNT: 14.3 % (ref 11.5–14.5)
EST. AVERAGE GLUCOSE BLD GHB EST-MCNC: 123 MG/DL
GLOBULIN SER-MCNC: 3 G/DL (ref 2–4)
GLUCOSE SERPL-MCNC: 126 MG/DL (ref 82–115)
HBA1C MFR BLD HPLC: 5.9 %
HCT VFR BLD AUTO: 38.6 % (ref 40–54)
HDLC SERPL-MCNC: 51 MG/DL (ref 35–60)
HGB BLD-MCNC: 12.4 G/DL (ref 13.5–18)
IMM GRANULOCYTES # BLD AUTO: 0.02 K/UL (ref 0–0.04)
IMM GRANULOCYTES NFR BLD: 0.3 % (ref 0–0.4)
LDLC SERPL CALC-MCNC: 95 MG/DL
LDLC/HDLC SERPL: 1.9 {RATIO}
LYMPHOCYTES # BLD AUTO: 2.3 K/UL (ref 1–4.8)
LYMPHOCYTES NFR BLD AUTO: 38 % (ref 27–41)
MCH RBC QN AUTO: 28.7 PG (ref 27–31)
MCHC RBC AUTO-ENTMCNC: 32.1 G/DL (ref 32–36)
MCV RBC AUTO: 89.4 FL (ref 80–96)
MICROALBUMIN UR-MCNC: 0.6 MG/DL
MICROALBUMIN/CREAT RATIO PNL UR: 13 MG/G (ref 0–30)
MONOCYTES # BLD AUTO: 0.53 K/UL (ref 0–0.8)
MONOCYTES NFR BLD AUTO: 8.7 % (ref 2–6)
MPC BLD CALC-MCNC: 10.4 FL (ref 9.4–12.4)
NEUTROPHILS # BLD AUTO: 3.06 K/UL (ref 1.8–7.7)
NEUTROPHILS NFR BLD AUTO: 50.5 % (ref 53–65)
NONHDLC SERPL-MCNC: 126 MG/DL
NRBC # BLD AUTO: 0 X10E3/UL
NRBC, AUTO (.00): 0 %
PLATELET # BLD AUTO: 242 K/UL (ref 150–400)
POTASSIUM SERPL-SCNC: 3.9 MMOL/L (ref 3.5–5.1)
PROT SERPL-MCNC: 7.1 G/DL (ref 5.8–7.6)
RBC # BLD AUTO: 4.32 M/UL (ref 4.6–6.2)
SODIUM SERPL-SCNC: 138 MMOL/L (ref 136–145)
TRIGL SERPL-MCNC: 156 MG/DL (ref 34–140)
VLDLC SERPL-MCNC: 31 MG/DL
WBC # BLD AUTO: 6.06 K/UL (ref 4.5–11)

## 2025-07-29 PROCEDURE — 82043 UR ALBUMIN QUANTITATIVE: CPT | Mod: ,,, | Performed by: CLINICAL MEDICAL LABORATORY

## 2025-07-29 PROCEDURE — 1159F MED LIST DOCD IN RCRD: CPT | Mod: ,,, | Performed by: NURSE PRACTITIONER

## 2025-07-29 PROCEDURE — 99214 OFFICE O/P EST MOD 30 MIN: CPT | Mod: ,,, | Performed by: NURSE PRACTITIONER

## 2025-07-29 PROCEDURE — 80061 LIPID PANEL: CPT | Mod: ,,, | Performed by: CLINICAL MEDICAL LABORATORY

## 2025-07-29 PROCEDURE — 1126F AMNT PAIN NOTED NONE PRSNT: CPT | Mod: ,,, | Performed by: NURSE PRACTITIONER

## 2025-07-29 PROCEDURE — 83036 HEMOGLOBIN GLYCOSYLATED A1C: CPT | Mod: ,,, | Performed by: CLINICAL MEDICAL LABORATORY

## 2025-07-29 PROCEDURE — 3078F DIAST BP <80 MM HG: CPT | Mod: ,,, | Performed by: NURSE PRACTITIONER

## 2025-07-29 PROCEDURE — 82570 ASSAY OF URINE CREATININE: CPT | Mod: ,,, | Performed by: CLINICAL MEDICAL LABORATORY

## 2025-07-29 PROCEDURE — 3061F NEG MICROALBUMINURIA REV: CPT | Mod: ,,, | Performed by: NURSE PRACTITIONER

## 2025-07-29 PROCEDURE — 3066F NEPHROPATHY DOC TX: CPT | Mod: ,,, | Performed by: NURSE PRACTITIONER

## 2025-07-29 PROCEDURE — 3288F FALL RISK ASSESSMENT DOCD: CPT | Mod: ,,, | Performed by: NURSE PRACTITIONER

## 2025-07-29 PROCEDURE — 3008F BODY MASS INDEX DOCD: CPT | Mod: ,,, | Performed by: NURSE PRACTITIONER

## 2025-07-29 PROCEDURE — 3077F SYST BP >= 140 MM HG: CPT | Mod: ,,, | Performed by: NURSE PRACTITIONER

## 2025-07-29 PROCEDURE — 80053 COMPREHEN METABOLIC PANEL: CPT | Mod: ,,, | Performed by: CLINICAL MEDICAL LABORATORY

## 2025-07-29 PROCEDURE — 1160F RVW MEDS BY RX/DR IN RCRD: CPT | Mod: ,,, | Performed by: NURSE PRACTITIONER

## 2025-07-29 PROCEDURE — 85025 COMPLETE CBC W/AUTO DIFF WBC: CPT | Mod: ,,, | Performed by: CLINICAL MEDICAL LABORATORY

## 2025-07-29 PROCEDURE — 3044F HG A1C LEVEL LT 7.0%: CPT | Mod: ,,, | Performed by: NURSE PRACTITIONER

## 2025-07-29 PROCEDURE — 1101F PT FALLS ASSESS-DOCD LE1/YR: CPT | Mod: ,,, | Performed by: NURSE PRACTITIONER

## 2025-07-29 RX ORDER — VALSARTAN AND HYDROCHLOROTHIAZIDE 320; 25 MG/1; MG/1
1 TABLET, FILM COATED ORAL DAILY
Qty: 90 TABLET | Refills: 1 | Status: SHIPPED | OUTPATIENT
Start: 2025-07-29

## 2025-07-29 RX ORDER — ROSUVASTATIN CALCIUM 20 MG/1
20 TABLET, COATED ORAL DAILY
Qty: 90 TABLET | Refills: 1 | Status: SHIPPED | OUTPATIENT
Start: 2025-07-29 | End: 2026-07-29

## 2025-07-29 RX ORDER — HYDRALAZINE HYDROCHLORIDE 100 MG/1
100 TABLET, FILM COATED ORAL 2 TIMES DAILY
Qty: 180 TABLET | Refills: 1 | Status: SHIPPED | OUTPATIENT
Start: 2025-07-29

## 2025-07-29 RX ORDER — METOPROLOL SUCCINATE 25 MG/1
25 TABLET, EXTENDED RELEASE ORAL DAILY
Qty: 90 TABLET | Refills: 1 | Status: SHIPPED | OUTPATIENT
Start: 2025-07-29

## 2025-07-30 NOTE — ASSESSMENT & PLAN NOTE
BP Readings from Last 3 Encounters:   07/29/25 (!) 169/78   04/28/25 122/70   01/28/25 (!) 158/89    Goal less 130/80  Encouraged home monitoring and bring log for review  Cont hydralazine and valsartan/hctz for now

## 2025-07-30 NOTE — PROGRESS NOTES
BRUNO Cohn   [facility] [address] [place] [address]  [phone]      PATIENT NAME: Joey Hughes  : 1955  DATE: 25  MRN: [id number]      Billing Provider: BRUNO Cohn  Level of Service:   Patient PCP Information       Provider PCP Type    BRUNO Cohn General            Reason for Visit / Chief Complaint: Follow-up, Hyperlipidemia, and Pre-diabetes (.Diabetes Urine Screening due on 2025 )       Update PCP  Update Chief Complaint         History of Present Illness / Problem Focused Workflow     Joey Hughes presents to the clinic with Follow-up, Hyperlipidemia, and Pre-diabetes (.Diabetes Urine Screening due on 2025 )     Pt presents for routine follow up with lab and med refills. Overall doing well.      BP appears poorly controlled today. Home meds reviewed    Advised to monitor BP at home. Advised on optimal BP readings - SBP < 130 & DBP < 80. Advised to call office for any persistent BP elevation and may have to prescribe or adjust BP med(s).  Recommended DASH diet, stay well hydrated with water daily, eliminate or decrease caffeinated and high calorie drinks, increase physical activity, and lose weight if BMI > 25.0. Written patient education information provided to patient with goals and recommendations to assist with BP management.     Discussed need for low fat/low cholesterol diet, regular exercise, and weight control.   Cardiovascular risk and specific lipid/LDL goals reviewed.        Review of Systems     Review of Systems   Constitutional:  Negative for fatigue and fever.   HENT:  Negative for nasal congestion and sore throat.    Eyes:  Negative for visual disturbance.   Respiratory:  Negative for chest tightness and shortness of breath.    Cardiovascular:  Negative for chest pain and leg swelling.   Gastrointestinal:  Negative for abdominal pain  and change in bowel habit.   Endocrine: Negative for polydipsia, polyphagia and polyuria.   Genitourinary:  Negative for dysuria and hematuria.   Musculoskeletal:  Negative for back pain and leg pain.   Integumentary:  Negative for rash.   Neurological:  Negative for dizziness, syncope, weakness and light-headedness.        Medical / Social / Family History     Past Medical History:   Diagnosis Date    Arthritis     High cholesterol     Hypertension        Past Surgical History:   Procedure Laterality Date    COLONOSCOPY      REPAIR, HERNIA, UMBILICAL N/A 12/19/2024    Procedure: REPAIR, HERNIA, UMBILICAL;  Surgeon: Amrit Watts MD;  Location: [facility];  Service: General;  Laterality: N/A;       Social History  Mr. Hughes  reports that he has never smoked. He has never used smokeless tobacco. He reports current alcohol use of about 7.0 standard drinks of alcohol per week. He reports that he does not use drugs.    Family History  Mr. Hughes's family history includes Cancer in his mother; Diabetes in his brother and brother.    Medications and Allergies     Medications  Outpatient Medications Marked as Taking for the 7/29/25 encounter (Office Visit) with Debbie Smith ACNP   Medication Sig Dispense Refill    mv, min #36-iron,carbonyl-FA (GERITOL COMPLETE) 16 mg iron- 0.38 mg Tab Take 1 tablet by mouth once daily.      [DISCONTINUED] hydrALAZINE (APRESOLINE) 100 MG tablet Take 1 tablet (100 mg total) by mouth 2 (two) times daily. 180 tablet 1    [DISCONTINUED] metoprolol succinate (TOPROL-XL) 25 MG 24 hr tablet Take 1 tablet (25 mg total) by mouth once daily. 90 tablet 1    [DISCONTINUED] rosuvastatin (CRESTOR) 20 MG tablet Take 1 tablet (20 mg total) by mouth once daily. 90 tablet 1    [DISCONTINUED] valsartan-hydrochlorothiazide (DIOVAN-HCT) 320-25 mg per tablet Take 1 tablet by mouth once daily. 90 tablet 1       Allergies  Review of patient's allergies indicates:  No Known Allergies    Physical  Examination     Vitals:    07/29/25 1346   BP: (!) 169/78   Pulse:    Resp:    Temp:      Physical Exam  Eyes:      Pupils: Pupils are equal, round, and reactive to light.   Cardiovascular:      Rate and Rhythm: Normal rate and regular rhythm.      Heart sounds: Normal heart sounds. No murmur heard.  Pulmonary:      Breath sounds: Normal breath sounds. No wheezing, rhonchi or rales.   Abdominal:      General: Bowel sounds are normal.      Palpations: Abdomen is soft.   Musculoskeletal:         General: No swelling.      Cervical back: Normal range of motion and neck supple.   Skin:     General: Skin is warm and dry.   Neurological:      Mental Status: He is alert and oriented to person, place, and time.          Lab Results   Component Value Date    WBC 6.06 07/29/2025    HGB 12.4 (L) 07/29/2025    HCT 38.6 (L) 07/29/2025    MCV 89.4 07/29/2025     07/29/2025        Sodium   Date Value Ref Range Status   07/29/2025 138 136 - 145 mmol/L Final     Potassium   Date Value Ref Range Status   07/29/2025 3.9 3.5 - 5.1 mmol/L Final     Chloride   Date Value Ref Range Status   07/29/2025 102 98 - 107 mmol/L Final     CO2   Date Value Ref Range Status   07/29/2025 29 23 - 31 mmol/L Final     Glucose   Date Value Ref Range Status   07/29/2025 126 (H) 82 - 115 mg/dL Final     BUN   Date Value Ref Range Status   07/29/2025 18 8 - 26 mg/dL Final     Creatinine   Date Value Ref Range Status   07/29/2025 1.25 0.72 - 1.25 mg/dL Final     Calcium   Date Value Ref Range Status   07/29/2025 9.6 8.8 - 10.0 mg/dL Final     Total Protein   Date Value Ref Range Status   07/29/2025 7.1 5.8 - 7.6 g/dL Final     Albumin   Date Value Ref Range Status   07/29/2025 4.1 3.4 - 4.8 g/dL Final     Bilirubin, Total   Date Value Ref Range Status   07/29/2025 0.5 <=1.5 mg/dL Final     Alk Phos   Date Value Ref Range Status   07/29/2025 49 40 - 150 U/L Final     AST   Date Value Ref Range Status   07/29/2025 25 11 - 45 U/L Final     ALT   Date  "Value Ref Range Status   07/29/2025 22 <=55 U/L Final     Anion Gap   Date Value Ref Range Status   07/29/2025 11 7 - 16 mmol/L Final     eGFR   Date Value Ref Range Status   07/29/2025 63 >=60 mL/min/1.73m2 Final     Comment:     Estimated GFR calculated using the CKD-EPI creatinine (2021) equation.      Lab Results   Component Value Date    HGBA1C 5.9 07/29/2025      Lab Results   Component Value Date    CHOL 177 07/29/2025    CHOL 214 (H) 04/28/2025    CHOL 188 01/28/2025     Lab Results   Component Value Date    HDL 51 07/29/2025    HDL 48 04/28/2025    HDL 54 01/28/2025     Lab Results   Component Value Date    LDLCALC 95 07/29/2025    LDLCALC 143 04/28/2025    LDLCALC 119 01/28/2025     No results found for: "DLDL"  Lab Results   Component Value Date    TRIG 156 (H) 07/29/2025    TRIG 115 04/28/2025    TRIG 77 01/28/2025     Lab Results   Component Value Date    CHOLHDL 3.5 07/29/2025    CHOLHDL 4.5 04/28/2025    CHOLHDL 3.5 01/28/2025      Lab Results   Component Value Date    TSH 1.100 02/16/2022        Assessment and Plan (including Health Maintenance)      Problem List  Smart Sets  Document Outside HM   :    Plan:     1. Primary hypertension  Assessment & Plan:  BP Readings from Last 3 Encounters:   07/29/25 (!) 169/78   04/28/25 122/70   01/28/25 (!) 158/89    Goal less 130/80  Encouraged home monitoring and bring log for review  Cont hydralazine and valsartan/hctz for now    Orders:  -     CBC Auto Differential; Future; Expected date: 07/29/2025  -     Comprehensive Metabolic Panel; Future; Expected date: 07/29/2025  -     Microalbumin/Creatinine Ratio, Urine; Future; Expected date: 07/29/2025  -     valsartan-hydrochlorothiazide (DIOVAN-HCT) 320-25 mg per tablet; Take 1 tablet by mouth once daily.  Dispense: 90 tablet; Refill: 1    2. Hyperlipidemia, unspecified hyperlipidemia type  Assessment & Plan:  Lab Results   Component Value Date    CHOL 177 07/29/2025    CHOL 214 (H) 04/28/2025    CHOL 188 " "01/28/2025      Lab Results   Component Value Date    HDL 51 07/29/2025    HDL 48 04/28/2025    HDL 54 01/28/2025     Lab Results   Component Value Date    LDLCALC 95 07/29/2025    LDLCALC 143 04/28/2025    LDLCALC 119 01/28/2025      Lab Results   Component Value Date    TRIG 156 (H) 07/29/2025    TRIG 115 04/28/2025    TRIG 77 01/28/2025     No results found for: "TOTALCHOLEST"  Lab Results   Component Value Date    NONHDLCHOL 126 07/29/2025    NONHDLCHOL 166 04/28/2025    NONHDLCHOL 134 01/28/2025     Lab Results   Component Value Date    CHOLHDL 3.5 07/29/2025    CHOLHDL 4.5 04/28/2025    CHOLHDL 3.5 01/28/2025    The current medical regimen is effective;  continue present plan and medications.  Cont crestor    Orders:  -     Lipid Panel; Future; Expected date: 07/29/2025  -     rosuvastatin (CRESTOR) 20 MG tablet; Take 1 tablet (20 mg total) by mouth once daily.  Dispense: 90 tablet; Refill: 1    3. Prediabetes  -     Hemoglobin A1C; Future; Expected date: 07/29/2025    4. Hypertension, unspecified type  Assessment & Plan:  BP Readings from Last 3 Encounters:   07/29/25 (!) 169/78   04/28/25 122/70   01/28/25 (!) 158/89    Goal less 130/80  Encouraged home monitoring and bring log for review  Cont hydralazine and valsartan/hctz for now    Orders:  -     metoprolol succinate (TOPROL-XL) 25 MG 24 hr tablet; Take 1 tablet (25 mg total) by mouth once daily.  Dispense: 90 tablet; Refill: 1    Other orders  -     hydrALAZINE (APRESOLINE) 100 MG tablet; Take 1 tablet (100 mg total) by mouth 2 (two) times daily.  Dispense: 180 tablet; Refill: 1         There are no Patient Instructions on file for this visit.     There are no preventive care reminders to display for this patient.      Health Maintenance Topics with due status: Not Due       Topic Last Completion Date    Colorectal Cancer Screening 02/22/2023    Influenza Vaccine 09/26/2024    TETANUS VACCINE 01/28/2025    PROSTATE-SPECIFIC ANTIGEN 04/28/2025    " Diabetes Urine Screening 07/29/2025    Lipid Panel 07/29/2025    Hemoglobin A1c 07/29/2025       Future Appointments   Date Time Provider Department Center   10/7/2025 11:00 AM AWV NURSE, [facility] FRANCES Hwang   11/6/2025  1:20 PM Debbie Smith ACNP [facility] FRANCES Hwang            Signature:  [facility]UN[facility] Cohn [facility] [address] [place] [address]  [phone]    Date of encounter: 7/29/25

## 2025-07-30 NOTE — ASSESSMENT & PLAN NOTE
"Lab Results   Component Value Date    CHOL 177 07/29/2025    CHOL 214 (H) 04/28/2025    CHOL 188 01/28/2025      Lab Results   Component Value Date    HDL 51 07/29/2025    HDL 48 04/28/2025    HDL 54 01/28/2025     Lab Results   Component Value Date    LDLCALC 95 07/29/2025    LDLCALC 143 04/28/2025    LDLCALC 119 01/28/2025      Lab Results   Component Value Date    TRIG 156 (H) 07/29/2025    TRIG 115 04/28/2025    TRIG 77 01/28/2025     No results found for: "TOTALCHOLEST"  Lab Results   Component Value Date    NONHDLCHOL 126 07/29/2025    NONHDLCHOL 166 04/28/2025    NONHDLCHOL 134 01/28/2025     Lab Results   Component Value Date    CHOLHDL 3.5 07/29/2025    CHOLHDL 4.5 04/28/2025    CHOLHDL 3.5 01/28/2025    The current medical regimen is effective;  continue present plan and medications.  Cont crestor  "

## 2025-08-28 DIAGNOSIS — I10 PRIMARY HYPERTENSION: ICD-10-CM

## 2025-08-28 RX ORDER — VALSARTAN AND HYDROCHLOROTHIAZIDE 320; 25 MG/1; MG/1
1 TABLET, FILM COATED ORAL DAILY
Qty: 90 TABLET | Refills: 1 | Status: SHIPPED | OUTPATIENT
Start: 2025-08-28

## (undated) DEVICE — GLOVE SENSICARE PI SURG 8

## (undated) DEVICE — SUT VICRYL PLUS 4-0 FS-2 27IN

## (undated) DEVICE — SUT PDS II O CT-2 VIL MONO

## (undated) DEVICE — SOL NACL IRR 1000ML BTL

## (undated) DEVICE — GOWN POLY REINF BRTH SLV XL

## (undated) DEVICE — COVER LIGHT HANDLE FLEX GRN

## (undated) DEVICE — GLOVE SENSICARE PI GRN 8

## (undated) DEVICE — GLOVE SENSICARE PI GRN 6.5

## (undated) DEVICE — GOWN NONREINF SET-IN SLV 2XL

## (undated) DEVICE — GLOVE SENSICARE PI GRN 6

## (undated) DEVICE — SLEEVE ECLIPSE GOWN STRL 23IN

## (undated) DEVICE — SPONGE COTTON TRAY 4X4IN

## (undated) DEVICE — BINDER ABDOMINAL TRI-PANEL 3XL

## (undated) DEVICE — KIT BASIC RUSH

## (undated) DEVICE — DRAPE INCISE IOBAN 2 13X13IN

## (undated) DEVICE — SYR 10CC LUER LOCK

## (undated) DEVICE — SUT CTD VICRYL 3-0 CR/SH

## (undated) DEVICE — SUT ETHIBOND 0 CR/CT-1 8-18

## (undated) DEVICE — STRIP MEDI WND CLSR 1/2X4IN